# Patient Record
Sex: FEMALE | Race: WHITE | Employment: UNEMPLOYED | URBAN - METROPOLITAN AREA
[De-identification: names, ages, dates, MRNs, and addresses within clinical notes are randomized per-mention and may not be internally consistent; named-entity substitution may affect disease eponyms.]

---

## 2019-06-27 LAB
ANTIBODY SCREEN, EXTERNAL: NEGATIVE
CHLAMYDIA, EXTERNAL: NEGATIVE
HBSAG, EXTERNAL: NEGATIVE
HIV, EXTERNAL: NON REACTIVE
N. GONORRHEA, EXTERNAL: NEGATIVE
RUBELLA, EXTERNAL: NORMAL
T. PALLIDUM, EXTERNAL: NON REACTIVE
TYPE, ABO & RH, EXTERNAL: NORMAL

## 2020-01-10 LAB — GRBS, EXTERNAL: NEGATIVE

## 2020-02-06 ENCOUNTER — HOSPITAL ENCOUNTER (INPATIENT)
Age: 30
LOS: 2 days | Discharge: HOME | End: 2020-02-09
Attending: OBSTETRICS & GYNECOLOGY | Admitting: OBSTETRICS & GYNECOLOGY
Payer: COMMERCIAL

## 2020-02-06 DIAGNOSIS — R52 POSTPARTUM PAIN: Primary | ICD-10-CM

## 2020-02-06 PROCEDURE — 76060000078 HC EPIDURAL ANESTHESIA: Performed by: ANESTHESIOLOGY

## 2020-02-06 PROCEDURE — 75410000000 HC DELIVERY VAGINAL/SINGLE: Performed by: OBSTETRICS & GYNECOLOGY

## 2020-02-06 PROCEDURE — 75810000275 HC EMERGENCY DEPT VISIT NO LEVEL OF CARE

## 2020-02-06 PROCEDURE — 75410000003 HC RECOV DEL/VAG/CSECN EA 0.5 HR: Performed by: OBSTETRICS & GYNECOLOGY

## 2020-02-06 PROCEDURE — 76815 OB US LIMITED FETUS(S): CPT | Performed by: OBSTETRICS & GYNECOLOGY

## 2020-02-06 PROCEDURE — 75410000002 HC LABOR FEE PER 1 HR: Performed by: OBSTETRICS & GYNECOLOGY

## 2020-02-07 ENCOUNTER — ANESTHESIA EVENT (OUTPATIENT)
Dept: LABOR AND DELIVERY | Age: 30
End: 2020-02-07
Payer: COMMERCIAL

## 2020-02-07 ENCOUNTER — ANESTHESIA (OUTPATIENT)
Dept: LABOR AND DELIVERY | Age: 30
End: 2020-02-07
Payer: COMMERCIAL

## 2020-02-07 PROBLEM — O42.90 LEAKAGE OF AMNIOTIC FLUID: Status: ACTIVE | Noted: 2020-02-07

## 2020-02-07 PROBLEM — O47.9 UTERINE CONTRACTIONS DURING PREGNANCY: Status: ACTIVE | Noted: 2020-02-07

## 2020-02-07 LAB
A1 MICROGLOB PLACENTAL VAG QL: POSITIVE
BASOPHILS # BLD: 0.1 K/UL (ref 0–0.1)
BASOPHILS NFR BLD: 1 % (ref 0–1)
CONTROL LINE PRESENT?: NORMAL
DAILY QC (YES/NO)?: YES
DIFFERENTIAL METHOD BLD: ABNORMAL
EOSINOPHIL # BLD: 0.1 K/UL (ref 0–0.4)
EOSINOPHIL NFR BLD: 1 % (ref 0–7)
ERYTHROCYTE [DISTWIDTH] IN BLOOD BY AUTOMATED COUNT: 12.6 % (ref 11.5–14.5)
EXPIRATION DATE: NORMAL
HCT VFR BLD AUTO: 37 % (ref 35–47)
HGB BLD-MCNC: 12.6 G/DL (ref 11.5–16)
IMM GRANULOCYTES # BLD AUTO: 0.1 K/UL (ref 0–0.04)
IMM GRANULOCYTES NFR BLD AUTO: 1 % (ref 0–0.5)
INTERNAL NEGATIVE CONTROL: NORMAL
KIT LOT NO.: NORMAL
LYMPHOCYTES # BLD: 3.5 K/UL (ref 0.8–3.5)
LYMPHOCYTES NFR BLD: 25 % (ref 12–49)
MCH RBC QN AUTO: 29.6 PG (ref 26–34)
MCHC RBC AUTO-ENTMCNC: 34.1 G/DL (ref 30–36.5)
MCV RBC AUTO: 86.9 FL (ref 80–99)
MONOCYTES # BLD: 1 K/UL (ref 0–1)
MONOCYTES NFR BLD: 7 % (ref 5–13)
NEUTS SEG # BLD: 9.5 K/UL (ref 1.8–8)
NEUTS SEG NFR BLD: 65 % (ref 32–75)
NRBC # BLD: 0 K/UL (ref 0–0.01)
NRBC BLD-RTO: 0 PER 100 WBC
PH, VAGINAL FLUID: 6.5 (ref 5–6.1)
PLATELET # BLD AUTO: 279 K/UL (ref 150–400)
PMV BLD AUTO: 10.7 FL (ref 8.9–12.9)
RBC # BLD AUTO: 4.26 M/UL (ref 3.8–5.2)
WBC # BLD AUTO: 14.3 K/UL (ref 3.6–11)

## 2020-02-07 PROCEDURE — 77030019905 HC CATH URETH INTMIT MDII -A

## 2020-02-07 PROCEDURE — 74011250636 HC RX REV CODE- 250/636: Performed by: OBSTETRICS & GYNECOLOGY

## 2020-02-07 PROCEDURE — 59025 FETAL NON-STRESS TEST: CPT

## 2020-02-07 PROCEDURE — 36415 COLL VENOUS BLD VENIPUNCTURE: CPT

## 2020-02-07 PROCEDURE — 74011000250 HC RX REV CODE- 250: Performed by: NURSE ANESTHETIST, CERTIFIED REGISTERED

## 2020-02-07 PROCEDURE — 00HU33Z INSERTION OF INFUSION DEVICE INTO SPINAL CANAL, PERCUTANEOUS APPROACH: ICD-10-PCS | Performed by: ANESTHESIOLOGY

## 2020-02-07 PROCEDURE — 74011000250 HC RX REV CODE- 250: Performed by: ANESTHESIOLOGY

## 2020-02-07 PROCEDURE — 85025 COMPLETE CBC W/AUTO DIFF WBC: CPT

## 2020-02-07 PROCEDURE — 65270000029 HC RM PRIVATE

## 2020-02-07 PROCEDURE — 83986 ASSAY PH BODY FLUID NOS: CPT | Performed by: OBSTETRICS & GYNECOLOGY

## 2020-02-07 PROCEDURE — 84112 EVAL AMNIOTIC FLUID PROTEIN: CPT | Performed by: OBSTETRICS & GYNECOLOGY

## 2020-02-07 PROCEDURE — 99285 EMERGENCY DEPT VISIT HI MDM: CPT

## 2020-02-07 PROCEDURE — 0KQM0ZZ REPAIR PERINEUM MUSCLE, OPEN APPROACH: ICD-10-PCS | Performed by: OBSTETRICS & GYNECOLOGY

## 2020-02-07 PROCEDURE — 77030005513 HC CATH URETH FOL11 MDII -B

## 2020-02-07 PROCEDURE — 75410000002 HC LABOR FEE PER 1 HR: Performed by: OBSTETRICS & GYNECOLOGY

## 2020-02-07 PROCEDURE — 77030014125 HC TY EPDRL BBMI -B: Performed by: ANESTHESIOLOGY

## 2020-02-07 PROCEDURE — 74011000250 HC RX REV CODE- 250

## 2020-02-07 PROCEDURE — 74011250636 HC RX REV CODE- 250/636

## 2020-02-07 RX ORDER — BUPIVACAINE HYDROCHLORIDE 2.5 MG/ML
INJECTION, SOLUTION EPIDURAL; INFILTRATION; INTRACAUDAL AS NEEDED
Status: DISCONTINUED | OUTPATIENT
Start: 2020-02-07 | End: 2020-02-07 | Stop reason: HOSPADM

## 2020-02-07 RX ORDER — FENTANYL CITRATE 50 UG/ML
INJECTION, SOLUTION INTRAMUSCULAR; INTRAVENOUS
Status: COMPLETED
Start: 2020-02-07 | End: 2020-02-07

## 2020-02-07 RX ORDER — BUPIVACAINE HYDROCHLORIDE 5 MG/ML
INJECTION, SOLUTION EPIDURAL; INTRACAUDAL AS NEEDED
Status: DISCONTINUED | OUTPATIENT
Start: 2020-02-07 | End: 2020-02-07 | Stop reason: HOSPADM

## 2020-02-07 RX ORDER — FENTANYL/BUPIVACAINE/NS/PF 2-1250MCG
1-16 PREFILLED PUMP RESERVOIR EPIDURAL CONTINUOUS
Status: DISCONTINUED | OUTPATIENT
Start: 2020-02-07 | End: 2020-02-08 | Stop reason: HOSPADM

## 2020-02-07 RX ORDER — NALOXONE HYDROCHLORIDE 0.4 MG/ML
0.4 INJECTION, SOLUTION INTRAMUSCULAR; INTRAVENOUS; SUBCUTANEOUS AS NEEDED
Status: DISCONTINUED | OUTPATIENT
Start: 2020-02-07 | End: 2020-02-08

## 2020-02-07 RX ORDER — NALBUPHINE HYDROCHLORIDE 10 MG/ML
2.5 INJECTION, SOLUTION INTRAMUSCULAR; INTRAVENOUS; SUBCUTANEOUS
Status: ACTIVE | OUTPATIENT
Start: 2020-02-07 | End: 2020-02-07

## 2020-02-07 RX ORDER — LIDOCAINE HYDROCHLORIDE AND EPINEPHRINE 20; 5 MG/ML; UG/ML
INJECTION, SOLUTION EPIDURAL; INFILTRATION; INTRACAUDAL; PERINEURAL AS NEEDED
Status: DISCONTINUED | OUTPATIENT
Start: 2020-02-07 | End: 2020-02-07 | Stop reason: HOSPADM

## 2020-02-07 RX ORDER — OXYTOCIN/0.9 % SODIUM CHLORIDE 30/500 ML
0-25 PLASTIC BAG, INJECTION (ML) INTRAVENOUS
Status: DISCONTINUED | OUTPATIENT
Start: 2020-02-07 | End: 2020-02-09

## 2020-02-07 RX ORDER — LIDOCAINE HYDROCHLORIDE 10 MG/ML
INJECTION INFILTRATION; PERINEURAL
Status: COMPLETED
Start: 2020-02-07 | End: 2020-02-07

## 2020-02-07 RX ORDER — BUTORPHANOL TARTRATE 2 MG/ML
2 INJECTION INTRAMUSCULAR; INTRAVENOUS
Status: DISCONTINUED | OUTPATIENT
Start: 2020-02-07 | End: 2020-02-09 | Stop reason: HOSPADM

## 2020-02-07 RX ORDER — EPHEDRINE SULFATE/0.9% NACL/PF 50 MG/5 ML
12.5 SYRINGE (ML) INTRAVENOUS
Status: ACTIVE | OUTPATIENT
Start: 2020-02-07 | End: 2020-02-08

## 2020-02-07 RX ORDER — FENTANYL CITRATE 50 UG/ML
100 INJECTION, SOLUTION INTRAMUSCULAR; INTRAVENOUS ONCE
Status: COMPLETED | OUTPATIENT
Start: 2020-02-07 | End: 2020-02-07

## 2020-02-07 RX ORDER — SODIUM CHLORIDE, SODIUM LACTATE, POTASSIUM CHLORIDE, CALCIUM CHLORIDE 600; 310; 30; 20 MG/100ML; MG/100ML; MG/100ML; MG/100ML
125 INJECTION, SOLUTION INTRAVENOUS CONTINUOUS
Status: DISCONTINUED | OUTPATIENT
Start: 2020-02-07 | End: 2020-02-09

## 2020-02-07 RX ORDER — TERBUTALINE SULFATE 1 MG/ML
INJECTION SUBCUTANEOUS
Status: DISPENSED
Start: 2020-02-07 | End: 2020-02-07

## 2020-02-07 RX ORDER — SODIUM CHLORIDE 0.9 % (FLUSH) 0.9 %
5-40 SYRINGE (ML) INJECTION EVERY 8 HOURS
Status: DISCONTINUED | OUTPATIENT
Start: 2020-02-07 | End: 2020-02-09

## 2020-02-07 RX ORDER — EPHEDRINE SULFATE/0.9% NACL/PF 50 MG/5 ML
SYRINGE (ML) INTRAVENOUS
Status: DISPENSED
Start: 2020-02-07 | End: 2020-02-07

## 2020-02-07 RX ORDER — ONDANSETRON 2 MG/ML
4 INJECTION INTRAMUSCULAR; INTRAVENOUS
Status: DISCONTINUED | OUTPATIENT
Start: 2020-02-07 | End: 2020-02-08 | Stop reason: HOSPADM

## 2020-02-07 RX ORDER — SODIUM CHLORIDE 0.9 % (FLUSH) 0.9 %
5-40 SYRINGE (ML) INJECTION AS NEEDED
Status: DISCONTINUED | OUTPATIENT
Start: 2020-02-07 | End: 2020-02-09 | Stop reason: HOSPADM

## 2020-02-07 RX ORDER — LIDOCAINE HYDROCHLORIDE 20 MG/ML
INJECTION, SOLUTION EPIDURAL; INFILTRATION; INTRACAUDAL; PERINEURAL AS NEEDED
Status: DISCONTINUED | OUTPATIENT
Start: 2020-02-07 | End: 2020-02-07 | Stop reason: HOSPADM

## 2020-02-07 RX ADMIN — SODIUM CHLORIDE, SODIUM LACTATE, POTASSIUM CHLORIDE, AND CALCIUM CHLORIDE 125 ML/HR: 600; 310; 30; 20 INJECTION, SOLUTION INTRAVENOUS at 12:35

## 2020-02-07 RX ADMIN — BUPIVACAINE HYDROCHLORIDE 2.5 ML: 5 INJECTION, SOLUTION EPIDURAL; INTRACAUDAL; PERINEURAL at 14:34

## 2020-02-07 RX ADMIN — LIDOCAINE HYDROCHLORIDE: 10 INJECTION, SOLUTION INFILTRATION; PERINEURAL at 23:29

## 2020-02-07 RX ADMIN — Medication 12 ML/HR: at 19:49

## 2020-02-07 RX ADMIN — Medication 10 ML/HR: at 13:12

## 2020-02-07 RX ADMIN — SODIUM CHLORIDE, SODIUM LACTATE, POTASSIUM CHLORIDE, AND CALCIUM CHLORIDE 125 ML/HR: 600; 310; 30; 20 INJECTION, SOLUTION INTRAVENOUS at 05:32

## 2020-02-07 RX ADMIN — ONDANSETRON 4 MG: 2 INJECTION INTRAMUSCULAR; INTRAVENOUS at 01:57

## 2020-02-07 RX ADMIN — BUPIVACAINE HYDROCHLORIDE 4 ML: 2.5 INJECTION, SOLUTION EPIDURAL; INFILTRATION; INTRACAUDAL; PERINEURAL at 04:43

## 2020-02-07 RX ADMIN — OXYTOCIN-SODIUM CHLORIDE 0.9% IV SOLN 30 UNIT/500ML 2 MILLI-UNITS/MIN: 30-0.9/5 SOLUTION at 06:49

## 2020-02-07 RX ADMIN — LIDOCAINE HYDROCHLORIDE,EPINEPHRINE BITARTRATE 3 ML: 20; .005 INJECTION, SOLUTION EPIDURAL; INFILTRATION; INTRACAUDAL; PERINEURAL at 04:43

## 2020-02-07 RX ADMIN — FENTANYL CITRATE 100 MCG: 50 INJECTION, SOLUTION INTRAMUSCULAR; INTRAVENOUS at 23:48

## 2020-02-07 RX ADMIN — SODIUM CHLORIDE, SODIUM LACTATE, POTASSIUM CHLORIDE, AND CALCIUM CHLORIDE 1000 ML: 600; 310; 30; 20 INJECTION, SOLUTION INTRAVENOUS at 03:26

## 2020-02-07 RX ADMIN — Medication 10 ML/HR: at 05:25

## 2020-02-07 RX ADMIN — SODIUM CHLORIDE, SODIUM LACTATE, POTASSIUM CHLORIDE, AND CALCIUM CHLORIDE 999 ML/HR: 600; 310; 30; 20 INJECTION, SOLUTION INTRAVENOUS at 04:23

## 2020-02-07 RX ADMIN — LIDOCAINE HYDROCHLORIDE 2.5 ML: 20 INJECTION, SOLUTION EPIDURAL; INFILTRATION; INTRACAUDAL; PERINEURAL at 14:34

## 2020-02-07 NOTE — PROGRESS NOTES
0700: Bedside and Verbal shift change report given to GINA Bates (oncoming nurse) by ARSLAN Jean,OREN (offgoing nurse). Report included the following information SBAR, Kardex, Procedure Summary, Intake/Output, MAR, Accordion, Recent Results and Med Rec Status. 7621: Dr. Jazmine Gonzalez at bedside to assess pt and discuss POC, SVE per MD, 5/80/-1. Pt repositioned on left side with peanut ball. 3037: This RN at bedside for FHR decel, pt repositioned on right side, pitocin stopped. 7803: Additional RNs to bedside for prolonged decel, IVFB started, O2 placed per non-rebreather face mask, pt turned on left side, bed in trendelenburg. Dr. Jazmine Gonzalez arrived at bedside, FHR returned to baseline. Will leave pitocin off and restart when able. 1305: Dr. Jazmine Gonzalez at bedside to assess pt and discuss POC, SVE per MD, 6/90/-1 to 0. Per MD, thicker cervix on right sidem pt repositioned on right side with peanut ball in flying cowgirl position. 1430: GINA Al at bedside to assess epidural following pt complaints of contraction pain on left side despite bolus via epidural pump. Bolus administered per CRNA. 1445: Pt reports no more pain with contractions. 1730: Dr. Jazmine Gonzalez at bedside to assess pt and discuss POC, SVE per MD, 8/90/0 to +1. US done per MD, baby OP. Will attempt position changes as tolerated. Dr. Jazmine Gonzalez notified of pink UOP, decreased amount in last ~1.5 hrs. No new orders. 1738: This RN and Jovany, OREN at bedside to assist pt in position changes. Side lying hip release on left for 4 contractions, side lying hip release on right for 3 contractions. Pt then assisted into exaggerated right side lying with peanut ball. 1808: Pt positioned in exaggerated left side lying with peanut ball. 1910: Bedside and Verbal shift change report given to NANDO Maldonado RN (oncoming nurse) by GINA Bates (offgoing nurse).  Report included the following information SBAR, Kardex, Procedure Summary, Intake/Output, MAR, Accordion, Recent Results and Med Rec Status.

## 2020-02-07 NOTE — PROGRESS NOTES
Late entry from 1300    Patient comfortable with epidural.    FHTs baseline 130, mod martita, accels present, decels--occ variables/earlies  TOCO q 4-5 minutes with periods of coupling  Pit @ 8  Cx 6/90/-1, thicker on pt's right (?related to griffin catheter)    Cont to titrate pitocin & cont position changes.

## 2020-02-07 NOTE — ANESTHESIA PREPROCEDURE EVALUATION
Relevant Problems   No relevant active problems       Anesthetic History   No history of anesthetic complications            Review of Systems / Medical History  Patient summary reviewed, nursing notes reviewed and pertinent labs reviewed    Pulmonary  Within defined limits                 Neuro/Psych   Within defined limits           Cardiovascular  Within defined limits                     GI/Hepatic/Renal  Within defined limits              Endo/Other  Within defined limits           Other Findings   Comments: scoliosis                Anesthetic Plan    ASA: 2  Anesthesia type: epidural            Anesthetic plan and risks discussed with: Patient

## 2020-02-07 NOTE — H&P
History & Physical    Name: Ken Ball MRN: [id number]  SSN: [id number]    YOB: 1990  Age: 30 y.o. Sex: female        Subjective:     Estimated Date of Delivery: 20  OB History    Para Term  AB Living   1             SAB TAB Ectopic Molar Multiple Live Births                    # Outcome Date GA Lbr Joey/2nd Weight Sex Delivery Anes PTL Lv   1 Current                Ms. Vilma Wooten is a  admitted with pregnancy at 38w9d with dating based upon 8 wk ultrasound for SROM and early labor. Complains of leakage of clear amniotic fluid that started at 2230 followed by the onset of regular painful uterine contractions every 3-6 minutes. Denies vaginal bleeding and has just experienced episode of nausea and vomiting. Prenatal care is complicated by scoliosis, chronic anxiety, and fetal heart echogenic foci. . . Please see prenatal records for details. Past Medical History:   Diagnosis Date    Essential hypertension 2217-9640    Htn, no medications   Elevated blood pressures prior to pregnancy thought to be secondary to stress  Chronic anxiety  Past Surgical History:   Procedure Laterality Date    HX OTHER SURGICAL  2012   tonosillectomy    GynHx: denies STIs  SH: social ETOH prior to pregnancy  Denies tobacco and illicit drug use  Social History     Occupational History    Not on file   Tobacco Use    Smoking status: Not on file   Substance and Sexual Activity    Alcohol use: Not on file    Drug use: Not on file    Sexual activity: Not on file     No family history on file. Allergies   Allergen Reactions    Azithromycin Swelling    Cefprozil Hives     Prior to Admission medications    Medication Sig Start Date End Date Taking? Authorizing Provider   PNV No.40-Iron Fum-FA Cmb No.1 27-1 mg tab Take  by mouth. Yes Provider, Historical        Review of Systems: Pertinent items are noted in HPI.     Objective:     Vitals:  Vitals:    20 2356 20 0006   BP: 125/72 Pulse: 72    Resp: 16    Temp: 98.2 °F (36.8 °C)    Weight:  79.4 kg (175 lb)   Height:  5' 5\" (1.651 m)        Physical Exam:  Patient without distress. Heart: Regular rate and rhythm or S1S2 present  Lung: clear to auscultation throughout lung fields, no wheezes, no rales, no rhonchi and normal respiratory effort  Abdomen: soft, nontender, gravid, EFW 7 pounds  Fundus: soft and non tender  Perineum: blood absent, amniotic fluid present  Cervical Exam: 1-/-3 vtx (exam by nurse)  Lower Extremities:  - Edema No  Membranes:  Spontaneous Rupture of Membranes;  Amniotic Fluid: clear fluid  Fetal Heart Rate: Baseline: 140 per minute  Variability: moderate  Accelerations: yes  Decelerations: none  Uterine contractions: regular, every 1-4 minutes    Prenatal Labs:   Lab Results   Component Value Date/Time    Rubella, External Immune 2019    GrBStrep, External Negative 01/10/2020    HBsAg, External Negative 2019    HIV, External Non reactive 2019    Gonorrhea, External Negative 2019    Chlamydia, External negative 2019    ABO,Rh A Positive 2019   1 hr gtt 77        Assessment/Plan:   Ass:  at 40 6/7 wks with SROM in early labor, cat 1 fetal tracing    Plan: Admit for labor management  Patient informed that she may ambulate since she declines anesthesia at this time  Epidural prn  Iv pain meds prn pending reactive fetal tracing and sve < 7 cm

## 2020-02-07 NOTE — PROGRESS NOTES
Patient reports feeling some pressure with contractions, otherwise she is comfortable.     FHTs baseline 145, mod variability, accels present, decels absent  TOCO q 2 minutes but still with periods of coupling/tripling and spacing  Pit @ 14  Cx 8/90/0/LOP    RN will try spinning babies

## 2020-02-07 NOTE — ANESTHESIA PROCEDURE NOTES
Epidural Block    Start time: 2/7/2020 4:35 AM  End time: 2/7/2020 4:48 AM  Performed by: Lisa Santana MD  Authorized by:  Lisa Santana MD     Pre-Procedure  Indication: labor epidural    Preanesthetic Checklist: patient identified, risks and benefits discussed, anesthesia consent, timeout performed and anesthesia consent      Epidural:   Patient position:  Seated  Prep region:  Lumbar  Prep: Betadine    Location:  L3-4    Needle and Epidural Catheter:   Needle Type:  Tuohy  Needle Gauge:  17 G  Injection Technique:  Loss of resistance using saline  Attempts:  1  Catheter Size:  18 G  Events: no paresthesia and negative aspiration test    Test Dose:  Lidocaine 1.5% w/ epi and negative    Assessment:   Catheter Secured:  Tegaderm and tape  Insertion:  Uncomplicated  Patient tolerance:  Patient tolerated the procedure well with no immediate complications

## 2020-02-07 NOTE — PROGRESS NOTES
Received sign out from Dr. Nai Moraes just after 0700. In to meet patient and . She is a 31 yo G1 @ 40 6/7 wks who presented last evening with SROM and early labor. She is now s/p epidural and is comfortable. Pitocin is infusing.     FHTs baseline 145, moderate variability, accels present, decels absent  TOCO some coupling noted  Cx 5/80/-1  Pit @ 2    Continue to titrate pitocin  Encourage position changes & peanut ball  CEFM/TOCO

## 2020-02-07 NOTE — PROGRESS NOTES
2325  Patient arrives from ED reporting a rupture of membranes at 2230 at home. Patient reports a moderate amount of fluid that looked clear to yellow with small chunks of brown mucusy blood in it. Patient reports to have had her membranes swept 2/6, and her cervical dialation has been 1 cm in the office the same time. Patient reports contractions every 5 minutes apart. 2335  Nitrazine test Positive  2345  Amnisure Positive  2350  SVE 1.5/30/+3  EFM and TOCO placed. Dr. Kade Modi notified of patient arrival.  Dr Kade Modi will confirm fetal position with an ultrasound. 0030  Reactive NST recorded. 2886  Patient is vomiting 400 ml liquid. 0864  Dr Kade Modi is at bedside assessing the patient , and is talking about the plan of care. Patient verbalized understanding. 1  Dr Kade Modi is performing an ultrasound to confirm the head position; vertex. [place] 60 signed. 0150  Peripheral IV started, and labs drawn. Per Dr. Kade Modi, patient may walk around at this time. 0159  Patient is ambulating in hallway. 0230  Patient is sitting on birthing ball.    0325  Patient call, patient is ready to start the fluid bolus for an epidural.    5483  Patient is vomiting in room. 0430  Dr. Daysi Perales notified patient's readyness for epidural placement. 3124  Dr Daysi Perales at bedside assessing and education the patient about the epidural.    0434  Time Out performed. 5552  Test Dose. Patient's BP is soft, Charge RN T Blanca notified and asked to bring ephedrine. 6737  Mcrae catheter placed. 0681  Patient repositioned onto lateral left side with peanut ball. 8332  Dr. Daysi Perales updated on patient progress. 0505  SVE 4/80/-2    0600  Patient is fast asleep. Did not reposition at this time. 2798  Dr Kade Modi at bedside assessing patient progress. Per Dr. Kade Modi, pitocin will be started to augment labor at this time.     2210  Pitocin started    0700  Bedside and Verbal shift change report given to Prabha Castillo RN (oncoming nurse) by Amber Sheppard RN (offgoing nurse). Report included the following information SBAR, Kardex, Procedure Summary, Intake/Output, MAR, Accordion, Recent Results and Med Rec Status.

## 2020-02-08 LAB
BASOPHILS # BLD: 0 K/UL (ref 0–0.1)
BASOPHILS NFR BLD: 0 % (ref 0–1)
DIFFERENTIAL METHOD BLD: ABNORMAL
EOSINOPHIL # BLD: 0 K/UL (ref 0–0.4)
EOSINOPHIL NFR BLD: 0 % (ref 0–7)
ERYTHROCYTE [DISTWIDTH] IN BLOOD BY AUTOMATED COUNT: 12.8 % (ref 11.5–14.5)
HCT VFR BLD AUTO: 32.9 % (ref 35–47)
HGB BLD-MCNC: 11.3 G/DL (ref 11.5–16)
IMM GRANULOCYTES # BLD AUTO: 0 K/UL (ref 0–0.04)
IMM GRANULOCYTES NFR BLD AUTO: 0 % (ref 0–0.5)
LYMPHOCYTES # BLD: 1.9 K/UL (ref 0.8–3.5)
LYMPHOCYTES NFR BLD: 7 % (ref 12–49)
MCH RBC QN AUTO: 30.1 PG (ref 26–34)
MCHC RBC AUTO-ENTMCNC: 34.3 G/DL (ref 30–36.5)
MCV RBC AUTO: 87.5 FL (ref 80–99)
MONOCYTES # BLD: 0.3 K/UL (ref 0–1)
MONOCYTES NFR BLD: 1 % (ref 5–13)
NEUTS BAND NFR BLD MANUAL: 9 %
NEUTS SEG # BLD: 25.1 K/UL (ref 1.8–8)
NEUTS SEG NFR BLD: 83 % (ref 32–75)
NRBC # BLD: 0 K/UL (ref 0–0.01)
NRBC BLD-RTO: 0 PER 100 WBC
PLATELET # BLD AUTO: 223 K/UL (ref 150–400)
PMV BLD AUTO: 10.2 FL (ref 8.9–12.9)
RBC # BLD AUTO: 3.76 M/UL (ref 3.8–5.2)
RBC MORPH BLD: ABNORMAL
WBC # BLD AUTO: 27.3 K/UL (ref 3.6–11)

## 2020-02-08 PROCEDURE — 77030021125

## 2020-02-08 PROCEDURE — 74011250636 HC RX REV CODE- 250/636: Performed by: OBSTETRICS & GYNECOLOGY

## 2020-02-08 PROCEDURE — 77030019905 HC CATH URETH INTMIT MDII -A

## 2020-02-08 PROCEDURE — 65270000029 HC RM PRIVATE

## 2020-02-08 PROCEDURE — 85025 COMPLETE CBC W/AUTO DIFF WBC: CPT

## 2020-02-08 PROCEDURE — 36415 COLL VENOUS BLD VENIPUNCTURE: CPT

## 2020-02-08 PROCEDURE — 74011250637 HC RX REV CODE- 250/637: Performed by: OBSTETRICS & GYNECOLOGY

## 2020-02-08 RX ORDER — ONDANSETRON 4 MG/1
4 TABLET, ORALLY DISINTEGRATING ORAL
Status: ACTIVE | OUTPATIENT
Start: 2020-02-08 | End: 2020-02-09

## 2020-02-08 RX ORDER — OXYTOCIN/0.9 % SODIUM CHLORIDE 20/1000 ML
125-500 PLASTIC BAG, INJECTION (ML) INTRAVENOUS ONCE
Status: COMPLETED | OUTPATIENT
Start: 2020-02-08 | End: 2020-02-08

## 2020-02-08 RX ORDER — HYDROCODONE BITARTRATE AND ACETAMINOPHEN 5; 325 MG/1; MG/1
1 TABLET ORAL
Status: DISCONTINUED | OUTPATIENT
Start: 2020-02-08 | End: 2020-02-09 | Stop reason: HOSPADM

## 2020-02-08 RX ORDER — HYDROCORTISONE ACETATE PRAMOXINE HCL 2.5; 1 G/100G; G/100G
CREAM TOPICAL AS NEEDED
Status: DISCONTINUED | OUTPATIENT
Start: 2020-02-08 | End: 2020-02-09 | Stop reason: HOSPADM

## 2020-02-08 RX ORDER — CLINDAMYCIN PHOSPHATE 600 MG/50ML
600 INJECTION, SOLUTION INTRAVENOUS ONCE
Status: COMPLETED | OUTPATIENT
Start: 2020-02-08 | End: 2020-02-08

## 2020-02-08 RX ORDER — ACETAMINOPHEN 325 MG/1
650 TABLET ORAL
Status: DISCONTINUED | OUTPATIENT
Start: 2020-02-08 | End: 2020-02-09 | Stop reason: HOSPADM

## 2020-02-08 RX ORDER — IBUPROFEN 800 MG/1
800 TABLET ORAL EVERY 8 HOURS
Status: DISCONTINUED | OUTPATIENT
Start: 2020-02-08 | End: 2020-02-09 | Stop reason: HOSPADM

## 2020-02-08 RX ORDER — ZOLPIDEM TARTRATE 5 MG/1
5 TABLET ORAL
Status: DISCONTINUED | OUTPATIENT
Start: 2020-02-08 | End: 2020-02-09 | Stop reason: HOSPADM

## 2020-02-08 RX ORDER — DOCUSATE SODIUM 100 MG/1
100 CAPSULE, LIQUID FILLED ORAL
Status: DISCONTINUED | OUTPATIENT
Start: 2020-02-08 | End: 2020-02-09 | Stop reason: HOSPADM

## 2020-02-08 RX ORDER — LANOLIN ALCOHOL/MO/W.PET/CERES
1 CREAM (GRAM) TOPICAL
Status: DISCONTINUED | OUTPATIENT
Start: 2020-02-08 | End: 2020-02-09 | Stop reason: HOSPADM

## 2020-02-08 RX ORDER — OXYCODONE AND ACETAMINOPHEN 5; 325 MG/1; MG/1
1 TABLET ORAL
Status: DISCONTINUED | OUTPATIENT
Start: 2020-02-08 | End: 2020-02-08

## 2020-02-08 RX ORDER — HYDROCODONE BITARTRATE AND ACETAMINOPHEN 5; 325 MG/1; MG/1
2 TABLET ORAL
Status: DISCONTINUED | OUTPATIENT
Start: 2020-02-08 | End: 2020-02-09 | Stop reason: HOSPADM

## 2020-02-08 RX ORDER — HYDROCODONE BITARTRATE AND ACETAMINOPHEN 5; 325 MG/1; MG/1
2 TABLET ORAL
Status: DISCONTINUED | OUTPATIENT
Start: 2020-02-08 | End: 2020-02-08

## 2020-02-08 RX ORDER — NALOXONE HYDROCHLORIDE 0.4 MG/ML
0.4 INJECTION, SOLUTION INTRAMUSCULAR; INTRAVENOUS; SUBCUTANEOUS AS NEEDED
Status: DISCONTINUED | OUTPATIENT
Start: 2020-02-08 | End: 2020-02-09 | Stop reason: HOSPADM

## 2020-02-08 RX ORDER — SIMETHICONE 80 MG
80 TABLET,CHEWABLE ORAL
Status: DISCONTINUED | OUTPATIENT
Start: 2020-02-08 | End: 2020-02-09 | Stop reason: HOSPADM

## 2020-02-08 RX ORDER — DIPHENHYDRAMINE HCL 25 MG
25 CAPSULE ORAL
Status: DISCONTINUED | OUTPATIENT
Start: 2020-02-08 | End: 2020-02-09 | Stop reason: HOSPADM

## 2020-02-08 RX ADMIN — HYDROCODONE BITARTRATE AND ACETAMINOPHEN 1 TABLET: 5; 325 TABLET ORAL at 05:39

## 2020-02-08 RX ADMIN — FERROUS SULFATE TAB 325 MG (65 MG ELEMENTAL FE) 325 MG: 325 (65 FE) TAB at 09:42

## 2020-02-08 RX ADMIN — DOCUSATE SODIUM 100 MG: 100 CAPSULE, LIQUID FILLED ORAL at 05:39

## 2020-02-08 RX ADMIN — IBUPROFEN 800 MG: 800 TABLET ORAL at 18:02

## 2020-02-08 RX ADMIN — CLINDAMYCIN PHOSPHATE 600 MG: 600 INJECTION, SOLUTION INTRAVENOUS at 00:47

## 2020-02-08 RX ADMIN — HYDROCODONE BITARTRATE AND ACETAMINOPHEN 1 TABLET: 5; 325 TABLET ORAL at 23:05

## 2020-02-08 RX ADMIN — IBUPROFEN 800 MG: 800 TABLET ORAL at 09:42

## 2020-02-08 RX ADMIN — IBUPROFEN 800 MG: 800 TABLET ORAL at 00:50

## 2020-02-08 RX ADMIN — Medication 2500 MILLI-UNITS/HR: at 00:47

## 2020-02-08 NOTE — PROGRESS NOTES
1950: RN at bedside for epidural sign off, patient rate about written order of fentanyl/bupivacaine of 10ml/hr running at 12ml/hr. Patient significant other stated that rate was increased per anesthesia due to breakthrough pain. Dariana Santana MD called for order update, per MD ok to continue to run at 12 ml/hr if patient is comfortable - MD to provide documentation/order update to reflect verbal orders.

## 2020-02-08 NOTE — PROGRESS NOTES
Post-Partum Day Number 1 Progress Note    Ricci Jeter     Assessment: Doing well, post partum day 1 s/p VAVD with OP presentation complicated by postpartum hemorrhage. [address] with EBL 1100 resolved with cytotec, methergine, and uterine sweep. Pre delivery hemoglobin 12.6, cbc from this am pending. Urinary Retention- patient with straight cath at 6, if no urination in 4 hours will repeat. Consider griffin placement. Plan:  1. Continue routine postpartum and perineal care as well as maternal education. Information for the patient's :  Skye Porter [[id number]]   Vaginal, Spontaneous   Patient doing well without significant complaint. Patient hasn't urinated since delivery. She was straight cathed at [place]. She denies feeling lightheaded, dizzy, or short of breath. Vitals:  Visit Vitals  /88   Pulse 86   Temp 98.4 °F (36.9 °C)   Resp 14   Ht 5' 5\" (1.651 m)   Wt 79.4 kg (175 lb)   SpO2 92%   Breastfeeding Unknown   BMI 29.12 kg/m²     Temp (24hrs), Av.7 °F (37.1 °C), Min:98.3 °F (36.8 °C), Max:99.9 °F (37.7 °C)        Exam:   Patient without distress. Abdomen soft, fundus firm, nontender                Perineum with normal lochia noted. Lower extremities are negative for swelling, cords or tenderness. Labs:     Lab Results   Component Value Date/Time    WBC 14.3 (H) 2020 02:02 AM    HGB 12.6 2020 02:02 AM    HCT 37.0 2020 02:02 AM    PLATELET 481  02:02 AM       No results found for this or any previous visit (from the past 24 hour(s)).

## 2020-02-08 NOTE — PROGRESS NOTES
Pt seen and examined. Fetal tachycardia with moderate variability, Cat II strip.      98.6, VSS  Sve: C/+2 by my exam, movement noted with pushing  FHT: Cat   [place]: Q2      A/ G1 with epidural, pushing 1 hour 50 minutes. Fetal tachycardia with moderate variability.   NO fever    P/ bolus 500 cc, watch for fever, continue to push

## 2020-02-08 NOTE — PROGRESS NOTES
Pt seen and examined, report received from outgoing physician. Pt feeling constant pressure.     AVSS  SVE: C/+2 per RN  [place]: Q1-2  FHT: Cat I      A/ G1 now complete    P/ begin pushing

## 2020-02-08 NOTE — ROUTINE PROCESS
Bedside and Verbal shift change report given to Cristo Coyle RN  (oncoming nurse) by Param Odonnell RN  (offgoing nurse). Report included the following information SBAR, Kardex, Intake/Output, MAR and Recent Results.

## 2020-02-08 NOTE — L&D DELIVERY NOTE
This patient was 30 or more weeks gestation at the time of [facility] go-live. For complete information pertaining to this patient's pregnancy, please refer to the paper chart and ACOG form. Delivery Note    Obstetrician:  Chata Smith MD    Assistant: none    Pre-Delivery Diagnosis: Term pregnancy and PROM    Post-Delivery Diagnosis: Living  infant(s), Stillborn infant(s), Female and persistent OP    Intrapartum Event: None    Procedure: Vacuum assisted delivery, placed Vacuum with pt at +2 station and OP, pulled with 2 contractions until fetal head +4, pop off X2, pt continued to push without vacuum for vaginal delivery. Epidural: YES    Monitor:  Fetal Heart Tones - External    Indications for instrumental delivery: none or maternal fatigue    Estimated Blood Loss: 1100, PPH, pitocin, cytotec, and methergine given. Uterine sweep for moderate amount of clot X2, Uterine massage performed     Episiotomy: n/a    Laceration(s):  2nd degree    Laceration(s) repair: YES    Presentation: Cephalic    Fetal Description: dai    Fetal Position: Occiput Posterior    Birth Weight: pending    Birth Length: pending    Apgar - One Minute: 8    Apgar - Five Minutes: 9    Umbilical Cord: 3 vessels present    Specimens: placenta meconium stained not sent           Complications:  PP hemorrhage           Cord Blood Results:   Information for the patient's :  Danyell Jetti [[id number]]   No results found for: ANKIT Tobin    Prenatal Labs:     Lab Results   Component Value Date/Time    ABO,Rh A Positive 2019    HBsAg, External Negative 2019    HIV, External Non reactive 2019    Rubella, External Immune 2019    Gonorrhea, External Negative 2019    Chlamydia, External negative 2019    GrBStrep, External Negative 01/10/2020        Attending Attestation: I performed the procedure, pt stable post resuscitative efforts for hemorrhage.   CBC in AM    Signed By: Efrem Murdock MD     February 8, 2020

## 2020-02-08 NOTE — PROGRESS NOTES
2000 Dr Jeremi Polanco at the bedside to introduce herself to the patient.  Pt begin pushing.  Pt continues to push with nurse at the bedside. 2100 Pt continues to push with nurse at the bedside. 2130 Pt continues to push with nurse at the bedside. 2200 Pt continues to push with nurse at the bedside. 65 Pt continues to push with nurse at the bedside. [address] Dr Jeremi Polanco at the bedside.     [address] assisted  of live female

## 2020-02-08 NOTE — PROGRESS NOTES
0563 pt up to BR, self brielle care w/set up. Unable to void. See doc flow sheet- straight cath for 300cc out. Then transfer to MIU via w/c, accompanied by tech, family, & baby.

## 2020-02-08 NOTE — PROGRESS NOTES
Bedside shift change report given to Elizabeth Ramirez RN (oncoming nurse) by Rufus Haas RN & Nikkie MARTE (offgoing nurse). Report included the following information SBAR, Kardex and MAR.

## 2020-02-08 NOTE — LACTATION NOTE
This note was copied from a baby's chart. 1100 - Note on patient door that family is resting; [facility] consult deferred at this time. 1400 - [facility] rounds. Family at bedside and mother asked to defer consult. Mother aware she may call for assistance with breastfeeding at any time. [phone] - Checked in with mom's primary RN. Patient hasn't slept and wishes to sleep. RN reports that mom is doing well breastfeeding. Output is WNL and sufficent with numerous breastfeeds noted in chart.

## 2020-02-09 VITALS
HEART RATE: 77 BPM | HEIGHT: 65 IN | OXYGEN SATURATION: 92 % | BODY MASS INDEX: 29.16 KG/M2 | SYSTOLIC BLOOD PRESSURE: 114 MMHG | DIASTOLIC BLOOD PRESSURE: 73 MMHG | TEMPERATURE: 97.6 F | WEIGHT: 175 LBS | RESPIRATION RATE: 16 BRPM

## 2020-02-09 PROCEDURE — 74011250637 HC RX REV CODE- 250/637: Performed by: OBSTETRICS & GYNECOLOGY

## 2020-02-09 RX ORDER — IBUPROFEN 800 MG/1
800 TABLET ORAL
Qty: 60 TAB | Refills: 1 | Status: SHIPPED | OUTPATIENT
Start: 2020-02-09

## 2020-02-09 RX ORDER — HYDROCODONE BITARTRATE AND ACETAMINOPHEN 5; 325 MG/1; MG/1
1 TABLET ORAL
Qty: 16 TAB | Refills: 0 | Status: SHIPPED | OUTPATIENT
Start: 2020-02-09 | End: 2020-02-16

## 2020-02-09 RX ADMIN — IBUPROFEN 800 MG: 800 TABLET ORAL at 02:04

## 2020-02-09 RX ADMIN — DOCUSATE SODIUM 100 MG: 100 CAPSULE, LIQUID FILLED ORAL at 10:18

## 2020-02-09 RX ADMIN — FERROUS SULFATE TAB 325 MG (65 MG ELEMENTAL FE) 325 MG: 325 (65 FE) TAB at 10:18

## 2020-02-09 RX ADMIN — IBUPROFEN 800 MG: 800 TABLET ORAL at 10:18

## 2020-02-09 NOTE — PROGRESS NOTES
Post-Partum Day Number 2 Progress Note    Jimmy Medrano     Assessment: Doing well, post partum day 2 s/p VAVD with OP presentation complicated by postpartum hemorrhage.     PPH with EBL 1100 resolved with cytotec, methergine, and uterine sweep. Pre delivery hemoglobin 12.6, PPD 1 CBC 11. 3.      Urinary Retention-resolved   Plan:   1. Discharge home today  2. Follow up in office in 6 weeks with Suha Swartz MD  3. Post partum activity advised, diet as tolerated  4. Discharge Medications: ibuprofen, percocet and medications prior to admission    Information for the patient's :  Tony Cook [[id number]]   Vaginal, Spontaneous   Patient doing well without significant complaint. Voiding without difficulty, normal lochia. Vitals:  Visit Vitals  /67   Pulse 68   Temp 97.6 °F (36.4 °C)   Resp 16   Ht 5' 5\" (1.651 m)   Wt 79.4 kg (175 lb)   SpO2 92%   Breastfeeding Unknown   BMI 29.12 kg/m²     Temp (24hrs), Av.8 °F (36.6 °C), Min:97.6 °F (36.4 °C), Max:98.3 °F (36.8 °C)      Exam:         Patient without distress. Abdomen soft, fundus firm, nontender                 Lower extremities are negative for swelling, cords or tenderness.     Labs:     Lab Results   Component Value Date/Time    WBC 27.3 (H) 2020 09:26 AM    WBC 14.3 (H) 2020 02:02 AM    HGB 11.3 (L) 2020 09:26 AM    HGB 12.6 2020 02:02 AM    HCT 32.9 (L) 2020 09:26 AM    HCT 37.0 2020 02:02 AM    PLATELET 480 8459 09:26 AM    PLATELET 031  02:02 AM       Recent Results (from the past 24 hour(s))   CBC WITH AUTOMATED DIFF    Collection Time: 20  9:26 AM   Result Value Ref Range    WBC 27.3 (H) 3.6 - 11.0 K/uL    RBC 3.76 (L) 3.80 - 5.20 M/uL    HGB 11.3 (L) 11.5 - 16.0 g/dL    HCT 32.9 (L) 35.0 - 47.0 %    MCV 87.5 80.0 - 99.0 FL    MCH 30.1 26.0 - 34.0 PG    MCHC 34.3 30.0 - 36.5 g/dL    RDW 12.8 11.5 - 14.5 %    PLATELET 767 748 - 583 K/uL    MPV 10.2 8.9 - 12.9 FL    NRBC 0.0 0  WBC    ABSOLUTE NRBC 0.00 0.00 - 0.01 K/uL    NEUTROPHILS 83 (H) 32 - 75 %    BAND NEUTROPHILS 9 %    LYMPHOCYTES 7 (L) 12 - 49 %    MONOCYTES 1 (L) 5 - 13 %    EOSINOPHILS 0 0 - 7 %    BASOPHILS 0 0 - 1 %    IMMATURE GRANULOCYTES 0 0.0 - 0.5 %    ABS. NEUTROPHILS 25.1 (H) 1.8 - 8.0 K/UL    ABS. LYMPHOCYTES 1.9 0.8 - 3.5 K/UL    ABS. MONOCYTES 0.3 0.0 - 1.0 K/UL    ABS. EOSINOPHILS 0.0 0.0 - 0.4 K/UL    ABS. BASOPHILS 0.0 0.0 - 0.1 K/UL    ABS. IMM.  GRANS. 0.0 0.00 - 0.04 K/UL    DF MANUAL      RBC COMMENTS NORMOCYTIC, NORMOCHROMIC

## 2020-02-09 NOTE — DISCHARGE INSTRUCTIONS
POST DELIVERY DISCHARGE INSTRUCTIONS    Name: An Quinones  YOB: 1990  Primary Diagnosis: Active Problems:    Leakage of amniotic fluid (2020)      Uterine contractions during pregnancy (2020)        General:     Diet/Diet Restrictions:  Eight 8-ounce glasses of fluid daily (water, juices); avoid excessive caffeine intake. Meals/snacks as desired which are high in fiber and carbohydrates and low in fat and cholesterol. Physical Activity / Restrictions / Safety:     Avoid heavy lifting, no more that 8 lbs. For 2-3 weeks; limit use of stairs to 2 times daily for the first week home. No driving for one week. Avoid intercourse 4-6 weeks, no douching or tampon use. Check with obstetrician before starting or resuming an exercise program.         Discharge Instructions/Special Treatment/Home Care Needs:     Continue prenatal vitamins. Continue to use squirt bottle with warm water on your episiotomy after each bathroom use until bleeding stops. If steri-strips applied to your incision, remove in 7-10 days. Call your doctor for the following:     Fever over 101 degrees by mouth. Vaginal bleeding heavier than a normal menstrual period or clot larger than a golf ball. Red streaks or increased swelling of legs, painful red streaks on your breast.  Painful urination, constipation and increased pain or swelling or discharge with your incision. If you feel extremely anxious or overwhelmed. If you have thoughts of harming yourself and/or your baby. Pain Management:     Pain Management:   Take Acetaminophen (Tylenol) or Ibuprofen (Advil, Motrin), as directed for pain. Use a warm Sitz bath 3 times daily to relieve episiotomy or hemorrhoidal discomfort. Heating pad to  incision as needed. For hemorrhoidal discomfort, use Tucks and Anusol cream as needed and directed. Follow-Up Care:      These are general instructions for a healthy lifestyle:    No smoking/ No tobacco products/ Avoid exposure to second hand smoke    Surgeon General's Warning:  Quitting smoking now greatly reduces serious risk to your health. Obesity, smoking, and sedentary lifestyle greatly increases your risk for illness    A healthy diet, regular physical exercise & weight monitoring are important for maintaining a healthy lifestyle    Recognize signs and symptoms of STROKE:    F-face looks uneven    A-arms unable to move or move unevenly    S-speech slurred or non-existent    T-time-call 911 as soon as signs and symptoms begin-DO NOT go       Back to bed or wait to see if you get better-TIME IS BRAIN. Patient Education        After Your Delivery (the Postpartum Period): Care Instructions  Your Care Instructions    Congratulations on the birth of your baby. Like pregnancy, the  period can be a time of excitement, petey, and exhaustion. You may look at your wondrous little baby and feel happy. You may also be overwhelmed by your new sleep hours and new responsibilities. At first, babies often sleep during the days and are awake at night. They do not have a pattern or routine. They may make sudden gasps, jerk themselves awake, or look like they have crossed eyes. These are all normal, and they may even make you smile. In these first weeks after delivery, try to take good care of yourself. It may take 4 to 6 weeks to feel like yourself again, and possibly longer if you had a  birth. You will likely feel very tired for several weeks. Your days will be full of ups and downs, but lots of petey as well. Follow-up care is a key part of your treatment and safety. Be sure to make and go to all appointments, and call your doctor if you are having problems. It's also a good idea to know your test results and keep a list of the medicines you take. How can you care for yourself at home?   Take care of your body after delivery  · Use pads instead of tampons for the bloody flow that may last as long as 2 weeks.  · Ease cramps with ibuprofen (Advil, Motrin). · Ease soreness of hemorrhoids and the area between your vagina and rectum with ice compresses or witch hazel pads. · Ease constipation by drinking lots of fluid and eating high-fiber foods. Ask your doctor about over-the-counter stool softeners. · Cleanse yourself with a gentle squeeze of warm water from a bottle instead of wiping with toilet paper. · Take a sitz bath in warm water several times a day. · Wear a good nursing bra. Ease sore and swollen breasts with warm, wet washcloths. · If you are not breastfeeding, use ice rather than heat for breast soreness. · Your period may not start for several months if you are breastfeeding. You may bleed more, and longer at first, than you did before you got pregnant. · Wait until you are healed (about 4 to 6 weeks) before you have sexual intercourse. Your doctor will tell you when it is okay to have sex. · Try not to travel with your baby for 5 or 6 weeks. If you take a long car trip, make frequent stops to walk around and stretch. Avoid exhaustion  · Rest every day. Try to nap when your baby naps. · Ask another adult to be with you for a few days after delivery. · Plan for  if you have other children. · Stay flexible so you can eat at odd hours and sleep when you need to. Both you and your baby are making new schedules. · Plan small trips to get out of the house. Change can make you feel less tired. · Ask for help with housework, cooking, and shopping. Remind yourself that your job is to care for your baby. Know about help for postpartum depression  · \"Baby blues\" are common for the first 1 to 2 weeks after birth. You may cry or feel sad or irritable for no reason. · Rest whenever you can. Being tired makes it harder to handle your emotions. · Go for walks with your baby. · Talk to your partner, friends, and family about your feelings.   · If your symptoms last for more than a few weeks, or if you feel very depressed, ask your doctor for help. · Postpartum depression can be treated. Support groups and counseling can help. Sometimes medicine can also help. Stay healthy  · Eat healthy foods so you have more energy and lose extra baby pounds. · If you breastfeed, avoid drugs. If you quit smoking during pregnancy, try to stay smoke-free. If you choose to have a drink now and then, have only one drink, and limit the number of occasions that you have a drink. Wait to breastfeed at least 2 hours after you have a drink to reduce the amount of alcohol the baby may get in the milk. · Start daily exercise after 4 to 6 weeks, but rest when you feel tired. · Learn exercises to tone your belly. Do Kegel exercises to regain strength in your pelvic muscles. You can do these exercises while you stand or sit. ? Squeeze the same muscles you would use to stop your urine. Your belly and thighs should not move. ? Hold the squeeze for 3 seconds, and then relax for 3 seconds. ? Start with 3 seconds. Then add 1 second each week until you are able to squeeze for 10 seconds. ? Repeat the exercise 10 to 15 times for each session. Do three or more sessions each day. · Find a class for new mothers and new babies that has an exercise time. · If you had a  birth, give yourself a bit more time before you exercise, and be careful. When should you call for help? Call 911 anytime you think you may need emergency care. For example, call if:    · You have thoughts of harming yourself, your baby, or another person.     · You passed out (lost consciousness).     · You have chest pain, are short of breath, or cough up blood.     · You have a seizure.    Call your doctor now or seek immediate medical care if:    · You have severe vaginal bleeding.  This means you are passing blood clots and soaking through a pad each hour for 2 or more hours.     · You are dizzy or lightheaded, or you feel like you may faint.     · You have a fever.     · You have new or more belly pain.     · You have signs of a blood clot in your leg (called a deep vein thrombosis), such as:  ? Pain in the calf, back of the knee, thigh, or groin. ? Redness and swelling in your leg or groin.     · You have signs of preeclampsia, such as:  ? Sudden swelling of your face, hands, or feet. ? New vision problems (such as dimness, blurring, or seeing spots). ? A severe headache.    Watch closely for changes in your health, and be sure to contact your doctor if:    · Your vaginal bleeding seems to be getting heavier.     · You have new or worse vaginal discharge.     · You feel sad, anxious, or hopeless for more than a few days.     · You do not get better as expected. Where can you learn more? Go to http://jassi-che.info/. Enter A461 in the search box to learn more about \"After Your Delivery (the Postpartum Period): Care Instructions. \"  Current as of: May 29, 2019  Content Version: 12.2  © 1644-4980 [employer]. Care instructions adapted under license by [facility] (which disclaims liability or warranty for this information). If you have questions about a medical condition or this instruction, always ask your healthcare professional. [address] any warranty or liability for your use of this information.

## 2020-02-09 NOTE — PROGRESS NOTES
Patient discharged to home with infant and significant other. Infant in carseat. Patient in wheelchair. Discharge bag, including diaper bag and supplies, and breastpump kit given. Prescriptions given x2 (norco, ibuprofen) . Discharge instructions reviewed with patient and significant other, signed by patient, and copies given. Per patient and significant other, no questions. Patient and infant bands verified. See infant note and/or footprint sheet on chart.

## 2020-02-09 NOTE — ROUTINE PROCESS
Bedside and Verbal shift change report given to Promise Davies RN  (oncoming nurse) by Mariposa Montesinos RN  (offgoing nurse). Report included the following information SBAR, Kardex, Intake/Output, MAR and Recent Results.

## 2020-02-09 NOTE — DISCHARGE SUMMARY
Obstetrical Discharge Summary     Name: Rosendo Rodriguez MRN: [id number]  SSN: [id number]    YOB: 1990  Age: 30 y.o. Sex: female      Admit Date: 2020    Discharge Date: 2020     Admitting Physician: Valerie Boone MD     Attending Physician:  Jamie Marte MD     Admission Diagnoses: Leakage of amniotic fluid [O42.90]  Uterine contractions during pregnancy [O62.2]    Discharge Diagnoses:   Information for the patient's :  Clifton Michelle [[id number]]   Delivery of a 3.73 kg female infant via Vaginal, Spontaneous on 2020 at 11:19 PM  by Chen Gata. Apgars were 8  and 9 . Additional Diagnoses:   Hospital Problems  Never Reviewed          Codes Class Noted POA    Leakage of amniotic fluid ICD-10-CM: O42.90  ICD-9-CM: 658.10  2020 Unknown        Uterine contractions during pregnancy ICD-10-CM: O62.2  ICD-9-CM: 661.20  2020 Unknown             Lab Results   Component Value Date/Time    Rubella, External Immune 2019    GrBStrep, External Negative 01/10/2020       Hospital Course: Patient had a postpartum hemorrhage immediately following delivery that resolved with medication. Her Hgb remains normal at 11.3 on postpartum day 1. She is asymptomatic and appropriate for discharge on postpartum day 2. Disposition at Discharge: Home or self care    Discharged Condition: Stable    Patient Instructions:   Current Discharge Medication List      START taking these medications    Details   HYDROcodone-acetaminophen (NORCO) 5-325 mg per tablet Take 1 Tab by mouth every six (6) hours as needed for Pain for up to 7 days. Max Daily Amount: 4 Tabs. Indications: pain  Qty: 16 Tab, Refills: 0    Associated Diagnoses: Postpartum pain      ibuprofen (MOTRIN) 800 mg tablet Take 1 Tab by mouth every eight (8) hours as needed for Pain.   Qty: 60 Tab, Refills: 1         CONTINUE these medications which have NOT CHANGED    Details   PNV No.40-Iron Fum-FA Cmb No.1 27-1 mg tab Take  by mouth. Reference my discharge instructions.     Follow-up Appointments   Procedures    FOLLOW UP VISIT Appointment in: 6 Weeks     Standing Status:   Standing     Number of Occurrences:   1     Order Specific Question:   Appointment in     Answer:   6 Weeks        Signed By:  Simone Snow MD     February 9, 2020

## 2020-07-15 ENCOUNTER — HOSPITAL ENCOUNTER (OUTPATIENT)
Dept: PHYSICAL THERAPY | Age: 30
Discharge: HOME | End: 2020-07-15
Payer: COMMERCIAL

## 2020-07-15 PROCEDURE — 97110 THERAPEUTIC EXERCISES: CPT | Performed by: PHYSICAL MEDICINE & REHABILITATION

## 2020-07-15 PROCEDURE — 97162 PT EVAL MOD COMPLEX 30 MIN: CPT | Performed by: PHYSICAL MEDICINE & REHABILITATION

## 2020-07-15 PROCEDURE — 97530 THERAPEUTIC ACTIVITIES: CPT | Performed by: PHYSICAL MEDICINE & REHABILITATION

## 2020-07-15 NOTE — PROGRESS NOTES
[address]  Phone: [phone]  Fax: [phone]    Plan of Care/ Statement of Necessity for Physical Therapy Services -15    Patient name: Maria Eugenia Reynolds  : 1990  Provider#: [id number]  Referral source: Radha Joshua MD      Medical/Treatment Diagnosis: Bilateral hip pain [M25.551, M25.552]     Prior Hospitalization: see medical history     Comorbidities: post  5 mos  Prior Level of Function: no pain with running   Medications: Verified on Patient Summary List    Start of Care: 7/15/20      Onset Date: 20       The Plan of Care and following information is based on the information from the initial evaluation. Assessment/ key information:  Pt referred to PFPT for evaluation and treatment of abdominal discomfort and heaviness with exercise post  5 mos. Pt presents with decreased pelvic floor ms strength, endurance and coordination on manual exam.  There is strength difference  in the R levator ani compared to L. She will benefit from skilled PT services to address her limitations and achieve her funcitonal goals      Evaluation Complexity History MEDIUM  Complexity : 1-2 comorbidities / personal factors will impact the outcome/ POC ; Examination MEDIUM Complexity : 3 Standardized tests and measures addressing body structure, function, activity limitation and / or participation in recreation  ;Presentation MEDIUM Complexity : Evolving with changing characteristics  ; Clinical Decision Making MEDIUM Complexity : FOTO score of 26-74  Overall Complexity Rating: MEDIUM    Problem List: pain affecting function, decrease strength, decrease ADL/ functional abilitiies, decrease activity tolerance and decrease flexibility/ joint mobility   Treatment Plan may include any combination of the following: Therapeutic exercise, Therapeutic activities, Neuromuscular re-education, Physical agent/modality, Manual therapy, Patient education, Self Care training and Functional mobility training  Patient / Family readiness to learn indicated by: asking questions  Persons(s) to be included in education: patient (P)  Barriers to Learning/Limitations: None  Patient Goal (s): return to running with no pain   Patient Self Reported Health Status: excellent  Rehabilitation Potential: excellent    Short Term Goals: To be accomplished in 6 weeks:  Patient will be independent with a progressive home exercise program    Patient will demonstrate & utilized pelvic floor ms protection techniques   Patient will demonstrate improved PFM strength to 3+/5 bilaterally in order to decrease UI symptoms   Patient will be independent with a progressive vaginal dilator therapy program  Patient will demonstrate 1 finger reduction in diastasis recti. Patient will run 1 mile with no abdominal discomfort      Long Term Goals: To be accomplished in 12 weeks:  Patient will demonstrate 4/5 PFM strength bilaterally in order to eliminate UI symptoms. Patient will demonstrate 10 second PFM holds at 4/5 in order to elminate UI symptoms. Patient will run 3 miles with no pain. Patient will have no pain with intercourse    Frequency / Duration: Patient to be seen 1 times per week for 6-12 weeks. Patient/ Caregiver education and instruction: self care and exercises    [x]  Plan of care has been reviewed with ANDRÉS White PT, MSPT     7/15/2020     ________________________________________________________________________    I certify that the above Therapy Services are being furnished while the patient is under my care. I agree with the treatment plan and certify that this therapy is necessary.     [facility] Signature:____________________  Date:____________Time: _________

## 2020-07-15 NOTE — PROGRESS NOTES
PT INITIAL EVALUATION NOTE 2-15    Patient Name: Caroline Stevens  Date:7/15/2020  : 1990  [x]  Patient  Verified  Payor: BLUE CROSS / Plan: [address] [place] [address] / Product Type: PPO /    In time: 80  Out time: 0345  Total Treatment Time (min): 60  Visit #: 1    Treatment Area: Bilateral hip pain [M25.551, M25.552]    SUBJECTIVE  Pain Level (0-10 scale):  0  Any medication changes, allergies to medications, adverse drug reactions, diagnosis change, or new procedure performed?: [] No    [x] Yes (see summary sheet for update)  Subjective:     Pt is 35year old female GIP1 post  20 with complaints of low abdominal discomfort and heaviness during and after running. She was able to slowly progress up to 3 mile distance but eventually stopped to due heaviness and discomfort during and after running. She is now walking 1 mile a day at most.  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 She has LBP related to scoliosis, she feels better when she is running. She is pumping and nursing exclusively. Her goal is to return to running        2nd degree tear. Pushed 3 1/2 hours, suction assist, devonte side up. She denies UI   She has a BM daily without straining, rated Norton 4. She has resumed IC but has pain with deep penetration.      PMH  Scoliosis   LBP     PLOF: no abdominal discomfort running  Mechanism of Injury: none  Previous Treatment/Compliance: none  PMHx/Surgical Hx:  - assisted  Work Hx: maternity leave  Living Situation: spouse, infant  Pt Goals: no   Barriers: none  Motivation: good  Substance use: none   FABQ Score: Reggie Female PFI  Cognition: A & O x 4        OBJECTIVE/EXAMINATION    JANELL:  2/3/1  (+) Gilettes test for relief of CC pain with palpation to abdominal ms wall at and above R rectus attachment     External Pelvic Exam  Non tender through external pelvic clock  No POP at rest or with sustained valsalva  Active contraction: present/hesitant  Active relaxation: absent     Internal Vaginal Exam:  Layer 1 non tender   Layer 2/3  Tender L levator ani with palpable hypertonicity  Bladder neck mobility:  NT    PERFECT SCORE CHART  P =  Power (Laycock Scale Grade 0-5) R 2  L 3  E =  Endurance (How long pt holds max contraction)  7 seconds  R =  Repetitions (How many times the repeats holds) 3 reps  F =  Fast Twitch (How many 1 second contractions in 10 seconds)  5 reps  E =  Elevation (Lift of post vaginal wall toward pubic bone) present  LEFT absent RIGHT  C =  Coordinated cocontraction of transverse abdominus present  T = Timing (squeeze and lift with cough) Absent      15 min Therapeutic Exercise:  [] See flow sheet :  PFMT up training with breath coordination, exhale with effort. Rationale: increase strength and improve coordination to improve the patients ability to run with no abdominal discomfort     30 min Therapeutic Activity:  []  See flow sheet :    Patient instructed in the role of physical therapy in the evaluation and treatment of pelvic floor dysfunction, applicable treatment modalities discussed. Expectations for regular home exercise participation and expected visit frequency in to achieve the patient's goals were discussed. Patient instructed in pelvic floor anatomy and function including levator ani, puborectalis and superficial pelvic  musculature. Patient was provided dietary information to improve stool quality including increasing soluble fiber intake (Bran Buds), probiotics (Activia yogurt) and increased water intake (6-8 glasses a day). Pt instructed in use of Gonzales stool chart to track progress. Patient educated in urinary urge suppression techniques including the KNACK, quick flicks, calmly walking rather than rushing to the toilet, nervous system down training.      Patient educated in proper defecation posture and technique including use of Squatty Potty for better puborectalis ms relaxation. Pt to avoid straining to pass stool in order to decrease strain on pelvic floor. Discussed expectations for staged PFM performance progress toward return to running. Rationale: increase strength and improve coordination  to improve the patients ability to return to running. With   [x] TE   [] TA   [] neuro   [] other: Patient Education: [x] Review HEP    [x] Progressed/Changed HEP based on:   [] positioning   [] body mechanics   [] transfers   [] heat/ice application    [] other:        Other Objective/Functional Measures: --     Pain Level (0-10 scale) post treatment:  0       ASSESSMENT: Pt referred to PFPT for evaluation and treatment of abdominal discomfort and heaviness with exercise post  5 mos. Pt presents with decreased pelvic floor ms strength, endurance and coordination on manual exam.  There is strength difference  in the R levator ani compared to L.   She will benefit from skilled PT services to address her limitations and achieve her funcitonal goals       [x]  See Plan of Roni Reeves PT,MSPT  7/15/2020

## 2020-07-20 ENCOUNTER — HOSPITAL ENCOUNTER (OUTPATIENT)
Dept: PHYSICAL THERAPY | Age: 30
Discharge: HOME | End: 2020-07-20
Payer: COMMERCIAL

## 2020-07-20 PROCEDURE — 97112 NEUROMUSCULAR REEDUCATION: CPT | Performed by: PHYSICAL MEDICINE & REHABILITATION

## 2020-07-20 PROCEDURE — 97110 THERAPEUTIC EXERCISES: CPT | Performed by: PHYSICAL MEDICINE & REHABILITATION

## 2020-07-20 NOTE — PROGRESS NOTES
PT DAILY TREATMENT NOTE 2-15    Patient Name: Alta Bonilla  Date:2020  : 1990  [x]  Patient  Verified  Payor: [employer] / Plan: [address] [place] [address] / Product Type: PPO /    In time: 745  Out time: 830  Total Treatment Time (min): 2  Visit #: 2  Treatment Area: Bilateral hip pain [M25.551, M25.552]    SUBJECTIVE  Pain Level (0-10 scale): 0  Any medication changes, allergies to medications, adverse drug reactions, diagnosis change, or new procedure performed?: [x] No    [] Yes (see summary sheet for update)  Subjective functional status/changes:   [] No changes reported  Doing well with HEP. Was able to run 1 mile with no abdominal discomfort. OBJECTIVE      30 min Therapeutic Exercise:  [] See flow sheet :      PFMT with emphasis on up training, breath coordination. Access Code: [email or web address]   URL: GREE/   Date: 2020   Prepared by: Yvonne Johnson     Exercises   Supine Diastasis Recti Correction with Sheet - 10 reps - 3 sets - 1x daily - 7x weekly   Supine Transversus Abdominis Bracing with Pelvic Floor Contraction - 5 reps - 5 sets - 1x daily - 7x weekly   Supine March with Posterior Pelvic Tilt - 5 reps - 5 sets - 1x daily - 7x weekly   Bent Knee Fallouts - 5 reps - 5 sets - 1x daily - 7x weekly      Rationale: increase strength and improve coordination to improve the patients ability to exercise, run with no difficulty      15 min Neuromuscular Re-education:  []  See flow sheet :  sEMG biofeedback with vaginal sensor, constant PT attendance for guidance. Rationale: increase strength, improve coordination and increase proprioception  to improve the patients ability to eliminate abdominal pain with exercise          With   [x] TE   [] TA   [x] neuro   [] other: Patient Education: [x] Review HEP    [x] Progressed/Changed HEP based on:   [x] positioning   [] body mechanics   [] transfers   [] heat/ice application    [x] other:  Advanced HEP.       Other Objective/Functional Measures:     sEMG Biofeedback Assessment:   10s Work Average 13.3  Max Effort 23.8  10s Rest Average 4.73  See readout in chart. Pain Level (0-10 scale) post treatment:  0    ASSESSMENT/Changes in Function:     Patient will continue to benefit from skilled PT services to modify and progress therapeutic interventions to attain remaining goals. [x]  See Plan of Care  []  See progress note/recertification  []  See Discharge Summary         Progress towards goals / Updated goals:  Able to advance exercises today with good tolerance. Pt continues to need instruction for correct exercise form and performance. Continues to demonstrate good potential to achieve functional goals stated on the plan of care.       PLAN  [x]  Upgrade activities as tolerated     []  Continue plan of care  []  Update interventions per flow sheet       []  Discharge due to:_  []  Other:_      Dimitri Huynh PT, MSPT  7/20/2020

## 2020-07-27 ENCOUNTER — HOSPITAL ENCOUNTER (OUTPATIENT)
Dept: PHYSICAL THERAPY | Age: 30
Discharge: HOME | End: 2020-07-27
Payer: COMMERCIAL

## 2020-07-27 PROCEDURE — 97110 THERAPEUTIC EXERCISES: CPT | Performed by: PHYSICAL MEDICINE & REHABILITATION

## 2020-07-27 NOTE — PROGRESS NOTES
PT DAILY TREATMENT NOTE 2-15    Patient Name: Loree Lino  Date:2020  : 1990  [x]  Patient  Verified  Payor: BLUE CROSS / Plan: [address] [place] [address] / Product Type: PPO /    In time: 745  Out time: 830  Total Treatment Time (min): 2  Visit #: 2  Treatment Area: Bilateral hip pain [M25.551, M25.552]    SUBJECTIVE  Pain Level (0-10 scale): 0  Any medication changes, allergies to medications, adverse drug reactions, diagnosis change, or new procedure performed?: [x] No    [] Yes (see summary sheet for update)  Subjective functional status/changes:   [] No changes reported  Was able to run 1 and 2  mile with no abdominal discomfort. Has not been doing HEP        OBJECTIVE      45 min Therapeutic Exercise:  [] See flow sheet :      PFMT with emphasis on up training, breath coordination. Access Code: [email or web address]   URL: [email or web address]   Date: 2020   Prepared by: Carol Ann Reilly     Exercises   Supine Diastasis Recti Correction with Sheet - 10 reps - 3 sets - 1x daily - 7x weekly   Supine Transversus Abdominis Bracing with Pelvic Floor Contraction - 5 reps - 5 sets - 1x daily - 7x weekly   Supine March with Posterior Pelvic Tilt - 5 reps - 5 sets - 1x daily - 7x weekly   Bent Knee Fallouts - 5 reps - 5 sets - 1x daily - 7x weekly     Added:        Rationale: increase strength and improve coordination to improve the patients ability to exercise, run with no difficulty             With   [x] TE   [] TA   [x] neuro   [] other: Patient Education: [x] Review HEP    [x] Progressed/Changed HEP based on:   [x] positioning   [] body mechanics   [] transfers   [] heat/ice application    [x] other:  Advanced HEP. Other Objective/Functional Measures:     sEMG Biofeedback Assessment:   10s Work Average 13.3  Max Effort 23.8  10s Rest Average 4.73  See readout in chart.       Pain Level (0-10 scale) post treatment:  0    ASSESSMENT/Changes in Function:     Patient will continue to benefit from skilled PT services to modify and progress therapeutic interventions to attain remaining goals. [x]  See Plan of Care  []  See progress note/recertification  []  See Discharge Summary         Progress towards goals / Updated goals:  Able to advance exercises today with good tolerance. Pt continues to need instruction for correct exercise form and performance. Continues to demonstrate good potential to achieve functional goals stated on the plan of care.       PLAN  [x]  Upgrade activities as tolerated     []  Continue plan of care  []  Update interventions per flow sheet       []  Discharge due to:_  []  Other:_      Steven Pagan PT, MSPT  7/27/2020

## 2020-08-05 ENCOUNTER — HOSPITAL ENCOUNTER (OUTPATIENT)
Dept: PHYSICAL THERAPY | Age: 30
Discharge: HOME | End: 2020-08-05
Payer: COMMERCIAL

## 2020-08-05 PROCEDURE — 97110 THERAPEUTIC EXERCISES: CPT | Performed by: PHYSICAL MEDICINE & REHABILITATION

## 2020-08-05 NOTE — PROGRESS NOTES
PT DAILY TREATMENT NOTE 2-15    Patient Name: Todd Barlow  Date:2020  : 1990  [x]  Patient  Verified  Payor: No Morris / Plan: [address] [place] [address] / Product Type: PPO /    In time: 915  Out time: 1000  Total Treatment Time (min): 45  Visit #: 4    Treatment Area: Bilateral hip pain [M25.551, M25.552]    SUBJECTIVE  Pain Level (0-10 scale):  0  Any medication changes, allergies to medications, adverse drug reactions, diagnosis change, or new procedure performed?: [x] No    [] Yes (see summary sheet for update)  Subjective functional status/changes:   [] No changes reported  Able to increase to 4 mile run with no difficulty. OBJECTIVE      45 min Therapeutic Exercise:  [] See flow sheet :    Access Code: [email or web address]   URL: [email or web address]   Date: 2020   Prepared by: Lester Shepherd     Exercises   Supine Bridge - 10 reps - 3 sets - 1x daily - 7x weekly   Bridge with Hip Abduction and Resistance - Ground Touches - 10 reps - 3 sets - 1x daily - 7x weekly   Supine Bridge with Mini Swiss Ball Between Knees - 10 reps - 3 sets - 1x daily - 7x weekly   Marching Bridge - 10 reps - 3 sets - 1x daily - 7x weekly   Bridge with Straight Leg Raise - 10 reps - 3 sets - 1x daily - 7x weekly   Supine 90/90 Abdominal Bracing - 10 reps - 3 sets - 1x daily - 7x weekly   Supine 90/90 Alternating Toe Touch - 10 reps - 3 sets - 1x daily - 7x weekly   Supine 90/90 with Leg Extensions - 10 reps - 3 sets - 1x daily - 7x weekly        Rationale: increase strength and improve coordination to improve the patients ability to return to distance running with no pain.              With   [x] TE   [] TA   [] neuro   [] other: Patient Education: [x] Review HEP    [x] Progressed/Changed HEP based on:   [x] positioning   [] body mechanics   [] transfers   [] heat/ice application    [] other:      Other Objective/Functional Measures:  --     Pain Level (0-10 scale) post treatment: 0     ASSESSMENT/Changes in Function:     Patient will continue to benefit from skilled PT services to modify and progress therapeutic interventions to attain remaining goals. [x]  See Plan of Care  []  See progress note/recertification  []  See Discharge Summary         Progress towards goals / Updated goals:  Able to advance exercises today with good tolerance. Pt continues to need instruction for correct exercise form and performance. Continues to demonstrate good potential to achieve functional goals stated on the plan of care. PLAN  [x]  Upgrade activities as tolerated     []  Continue plan of care  []  Update interventions per flow sheet       []  Discharge due to:_  [x]  Other:  Retest sEMG performance next week.        Shelby Gomez, PT, MSPT  8/5/2020

## 2020-08-12 ENCOUNTER — HOSPITAL ENCOUNTER (OUTPATIENT)
Dept: PHYSICAL THERAPY | Age: 30
Discharge: HOME | End: 2020-08-12
Payer: COMMERCIAL

## 2020-08-12 PROCEDURE — 97110 THERAPEUTIC EXERCISES: CPT | Performed by: PHYSICAL MEDICINE & REHABILITATION

## 2020-08-12 PROCEDURE — 97112 NEUROMUSCULAR REEDUCATION: CPT | Performed by: PHYSICAL MEDICINE & REHABILITATION

## 2020-08-12 NOTE — PROGRESS NOTES
[address] [facility] [place] Sheikh 58  Phone: [phone]  Fax: [phone]    Discharge Summary  2-15    Patient name: Jason Morales  : 1990  Provider#: [id number]  Referral source: Jay Jung MD      Medical/Treatment Diagnosis: Bilateral hip pain [M25.551, M25.552]     Prior Hospitalization: see medical history     Comorbidities: See Plan of Care  Prior Level of Function:See Plan of Care  Medications: Verified on Patient Summary List    Start of Care: 7/15/20      Onset Date: 20    Visits from Start of Care: 6     Missed Visits: 0  Reporting Period : 7/15/20  to 20    ASSESSMENT/SUMMARY OF CARE: Mrs Brian Bourgeois was referred to pelvic PT for evaluation and treatment of abdominal discomfort with running and exercise post . She was seen for 6 visits. Treatment consisted of manual therapy, therapeutic exercise, therapeutic activity, bladder health education, bowel habit education, biofeedback assisted pelvic floor training, home exercise program development and progression. Mrs Brian Bourgeois made steady gains with PT efforts, she demonstrates improvements in pelvic floor musculature recruitment, coordination and endurance on manual and sEMG biofeedback testing. She is able to perform high level core strengthening exercises and has completed 2 5K runs with no symptoms. She denies UI, pain with IC. JANELL decreased to 1.5     /She is discharged today to an ongoing progressive home exercise program with excellent results.      Short Term Goals: To be accomplished in 6 weeks:  Patient will be independent with a progressive home exercise program    MET  Patient will demonstrate & utilized pelvic floor ms protection techniques   MET  Patient will demonstrate improved PFM strength to 3+/5 bilaterally in order to decrease UI symptoms MET  Patient will be independent with a progressive vaginal dilator therapy program MET  Patient will demonstrate 1 finger reduction in diastasis recti. MET  Patient will run 1 mile with no abdominal discomfort   MET     Long Term Goals: To be accomplished in 12 weeks:  Patient will demonstrate 4/5 PFM strength bilaterally in order to eliminate UI symptoms. MET  Patient will demonstrate 10 second PFM holds at 4/5 in order to elminate UI symptoms. MET  Patient will run 3 miles with no pain.   MET  Patient will have no pain with intercourse  MET      RECOMMENDATIONS:  [x]Discontinue therapy: [x]Patient has reached or is progressing toward set goals      []Patient is non-compliant or has abdicated      []Due to lack of appreciable progress towards set goals      []Other    Lisa Pitt, PT, MSPT    8/12/2020

## 2020-08-12 NOTE — PROGRESS NOTES
PT DAILY TREATMENT NOTE 2-15    Patient Name: Luna Davis  Date:2020  : 1990  [x]  Patient  Verified  Payor: BLUE CROSS / Plan: [address] [place] [address] / Product Type: PPO /    In time: 46  Out time:  0845  Total Treatment Time (min): 60  Visit #: 6  Treatment Area: Bilateral hip pain [M25.551, M25.552]    SUBJECTIVE  Pain Level (0-10 scale):  0  Any medication changes, allergies to medications, adverse drug reactions, diagnosis change, or new procedure performed?: [x] No    [] Yes (see summary sheet for update)  Subjective functional status/changes:   [] No changes reported  Ran 2 5Ks with no abdominal pain. Has been doing HEP diligently. No LIZBETH/UUI. No pain with IC. OBJECTIVE    45 min Therapeutic Exercise:  [] See flow sheet :    Reviewed and practiced HEP. Access Code: [email or web address]   URL: [email or web address]   Date: 2020   Prepared by: Peyton Clifford     Exercises   Sidestepping with Pelvic Floor Contraction - 10 reps - 3 sets - 1x daily - 7x weekly   Squat with Chair Touch - 10 reps - 3 sets - 1x daily - 7x weekly   Partial Squats with Baby - 10 reps - 3 sets - 1x daily - 7x weekly   Sumo Squat with Dumbbell - 10 reps - 3 sets - 1x daily - 7x weekly   Single Leg Running Balance - 10 reps - 3 sets - 1x daily - 7x weekly   Single Leg Stance on Foam Pad - 10 reps - 3 sets - 1x daily - 7x weekly   Jump to Single Leg Stance - 10 reps - 3 sets - 1x daily - 7x weekly      Rationale: increase strength, improve coordination and improve balance to improve the patients ability to running long distances with no discomfort. 15 min Neuromuscular Re-education:  []  See flow sheet :   Rationale: increase strength, improve coordination and increase proprioception  to improve the patients ability to run long distances with no discomfort.            With   [x] TE   [] TA   [x] neuro   [] other: Patient Education: [x] Review HEP    [x] Progressed/Changed HEP based on:   [x] positioning   [x] body mechanics   [] transfers   [] heat/ice application    [x] other:  Advanced challenge to single leg stance and plyometrics. Perform pre run, mid run and post run [facility] as indicated. Other Objective/Functional Measures:     sEMG Biofeedback Assessment:   10s Work Average: 16.5mV  (improved)  Max Effort: 25.4mV  (improved)  10s Rest Average: 5.32mV (WNL)  See readout in chart. Pain Level (0-10 scale) post treatment: 0    ASSESSMENT/Changes in Function:    Able to advance running distance with no abdominal discomfort or UI. She was able to advance to single leg stance and plyometrics today with good tolerance. sEMG biofeedback testing demonstrates clear improvement in recruitment, coordination and endurance of PF musculature. Pt will continue to improve with HEP practice and slow increase in running distance. Ready for DC today. []  See Plan of Care  []  See progress note/recertification  [x]  See Discharge Summary         Progress towards goals / Updated goals:  See DC summary     PLAN  []  Upgrade activities as tolerated     []  Continue plan of care  []  Update interventions per flow sheet       [x]  Discharge due to: goals met.    []  Other:_      Eugenio Veronica, PT 8/12/2020

## 2020-08-19 ENCOUNTER — APPOINTMENT (OUTPATIENT)
Dept: PHYSICAL THERAPY | Age: 30
End: 2020-08-19
Payer: COMMERCIAL

## 2022-03-18 PROBLEM — O47.9 UTERINE CONTRACTIONS DURING PREGNANCY: Status: ACTIVE | Noted: 2020-02-07

## 2022-03-19 PROBLEM — O42.90 LEAKAGE OF AMNIOTIC FLUID: Status: ACTIVE | Noted: 2020-02-07

## 2022-04-28 LAB
ANTIBODY SCREEN, EXTERNAL: NEGATIVE
CHLAMYDIA, EXTERNAL: NEGATIVE
HBSAG, EXTERNAL: NEGATIVE
HIV, EXTERNAL: NEGATIVE
N. GONORRHEA, EXTERNAL: NEGATIVE
RPR, EXTERNAL: NORMAL
RUBELLA, EXTERNAL: NORMAL

## 2022-11-15 LAB — GRBS, EXTERNAL: NEGATIVE

## 2022-12-14 ENCOUNTER — ANESTHESIA EVENT (OUTPATIENT)
Dept: LABOR AND DELIVERY | Age: 32
End: 2022-12-14
Payer: COMMERCIAL

## 2022-12-14 ENCOUNTER — ANESTHESIA (OUTPATIENT)
Dept: LABOR AND DELIVERY | Age: 32
End: 2022-12-14
Payer: COMMERCIAL

## 2022-12-14 ENCOUNTER — HOSPITAL ENCOUNTER (INPATIENT)
Age: 32
LOS: 3 days | Discharge: HOME | End: 2022-12-17
Attending: OBSTETRICS & GYNECOLOGY | Admitting: STUDENT IN AN ORGANIZED HEALTH CARE EDUCATION/TRAINING PROGRAM
Payer: COMMERCIAL

## 2022-12-14 PROBLEM — Z34.90 PREGNANCY: Status: ACTIVE | Noted: 2022-12-14

## 2022-12-14 LAB
BASOPHILS # BLD: 0.1 K/UL (ref 0–0.1)
BASOPHILS NFR BLD: 0 % (ref 0–1)
DIFFERENTIAL METHOD BLD: ABNORMAL
EOSINOPHIL # BLD: 0.1 K/UL (ref 0–0.4)
EOSINOPHIL NFR BLD: 1 % (ref 0–7)
ERYTHROCYTE [DISTWIDTH] IN BLOOD BY AUTOMATED COUNT: 12.7 % (ref 11.5–14.5)
HCT VFR BLD AUTO: 35.3 % (ref 35–47)
HGB BLD-MCNC: 12.1 G/DL (ref 11.5–16)
IMM GRANULOCYTES # BLD AUTO: 0.1 K/UL (ref 0–0.04)
IMM GRANULOCYTES NFR BLD AUTO: 0 % (ref 0–0.5)
LYMPHOCYTES # BLD: 3.1 K/UL (ref 0.8–3.5)
LYMPHOCYTES NFR BLD: 25 % (ref 12–49)
MCH RBC QN AUTO: 30 PG (ref 26–34)
MCHC RBC AUTO-ENTMCNC: 34.3 G/DL (ref 30–36.5)
MCV RBC AUTO: 87.6 FL (ref 80–99)
MONOCYTES # BLD: 0.8 K/UL (ref 0–1)
MONOCYTES NFR BLD: 7 % (ref 5–13)
NEUTS SEG # BLD: 8.4 K/UL (ref 1.8–8)
NEUTS SEG NFR BLD: 67 % (ref 32–75)
NRBC # BLD: 0 K/UL (ref 0–0.01)
NRBC BLD-RTO: 0 PER 100 WBC
PLATELET # BLD AUTO: 260 K/UL (ref 150–400)
PMV BLD AUTO: 10.7 FL (ref 8.9–12.9)
RBC # BLD AUTO: 4.03 M/UL (ref 3.8–5.2)
WBC # BLD AUTO: 12.5 K/UL (ref 3.6–11)

## 2022-12-14 PROCEDURE — 85025 COMPLETE CBC W/AUTO DIFF WBC: CPT

## 2022-12-14 PROCEDURE — 75410000002 HC LABOR FEE PER 1 HR: Performed by: ADVANCED PRACTICE MIDWIFE

## 2022-12-14 PROCEDURE — 2709999900 HC NON-CHARGEABLE SUPPLY

## 2022-12-14 PROCEDURE — 36415 COLL VENOUS BLD VENIPUNCTURE: CPT

## 2022-12-14 PROCEDURE — 99284 EMERGENCY DEPT VISIT MOD MDM: CPT

## 2022-12-14 PROCEDURE — 59025 FETAL NON-STRESS TEST: CPT

## 2022-12-14 PROCEDURE — 65270000029 HC RM PRIVATE

## 2022-12-14 PROCEDURE — 86901 BLOOD TYPING SEROLOGIC RH(D): CPT

## 2022-12-14 RX ORDER — NALOXONE HYDROCHLORIDE 0.4 MG/ML
0.4 INJECTION, SOLUTION INTRAMUSCULAR; INTRAVENOUS; SUBCUTANEOUS AS NEEDED
Status: DISCONTINUED | OUTPATIENT
Start: 2022-12-14 | End: 2022-12-15 | Stop reason: SDUPTHER

## 2022-12-14 RX ORDER — FENTANYL/BUPIVACAINE/NS/PF 2-1250MCG
1-16 PREFILLED PUMP RESERVOIR EPIDURAL CONTINUOUS
Status: DISCONTINUED | OUTPATIENT
Start: 2022-12-15 | End: 2022-12-15 | Stop reason: HOSPADM

## 2022-12-14 RX ORDER — ONDANSETRON 2 MG/ML
8 INJECTION INTRAMUSCULAR; INTRAVENOUS
Status: DISCONTINUED | OUTPATIENT
Start: 2022-12-14 | End: 2022-12-15 | Stop reason: HOSPADM

## 2022-12-14 RX ORDER — OXYTOCIN/RINGER'S LACTATE 30/500 ML
PLASTIC BAG, INJECTION (ML) INTRAVENOUS
Status: COMPLETED
Start: 2022-12-14 | End: 2022-12-15

## 2022-12-14 RX ORDER — DOCUSATE SODIUM 100 MG/1
100 CAPSULE, LIQUID FILLED ORAL
Status: DISCONTINUED | OUTPATIENT
Start: 2022-12-14 | End: 2022-12-15 | Stop reason: SDUPTHER

## 2022-12-14 RX ORDER — ACETAMINOPHEN 325 MG/1
650 TABLET ORAL
Status: DISCONTINUED | OUTPATIENT
Start: 2022-12-14 | End: 2022-12-15 | Stop reason: SDUPTHER

## 2022-12-14 RX ORDER — CALCIUM CARBONATE 200(500)MG
400 TABLET,CHEWABLE ORAL
Status: DISCONTINUED | OUTPATIENT
Start: 2022-12-14 | End: 2022-12-15 | Stop reason: HOSPADM

## 2022-12-14 RX ORDER — MINERAL OIL
120 OIL (ML) ORAL ONCE
Status: DISCONTINUED | OUTPATIENT
Start: 2022-12-14 | End: 2022-12-15 | Stop reason: HOSPADM

## 2022-12-15 LAB
ABO + RH BLD: NORMAL
BLOOD GROUP ANTIBODIES SERPL: NORMAL
SPECIMEN EXP DATE BLD: NORMAL

## 2022-12-15 PROCEDURE — 4A1HXCZ MONITORING OF PRODUCTS OF CONCEPTION, CARDIAC RATE, EXTERNAL APPROACH: ICD-10-PCS | Performed by: STUDENT IN AN ORGANIZED HEALTH CARE EDUCATION/TRAINING PROGRAM

## 2022-12-15 PROCEDURE — 75410000000 HC DELIVERY VAGINAL/SINGLE: Performed by: ADVANCED PRACTICE MIDWIFE

## 2022-12-15 PROCEDURE — 65270000029 HC RM PRIVATE

## 2022-12-15 PROCEDURE — 74011250636 HC RX REV CODE- 250/636

## 2022-12-15 PROCEDURE — 74011250637 HC RX REV CODE- 250/637

## 2022-12-15 PROCEDURE — 0HQ9XZZ REPAIR PERINEUM SKIN, EXTERNAL APPROACH: ICD-10-PCS | Performed by: STUDENT IN AN ORGANIZED HEALTH CARE EDUCATION/TRAINING PROGRAM

## 2022-12-15 PROCEDURE — 74011000250 HC RX REV CODE- 250: Performed by: ANESTHESIOLOGY

## 2022-12-15 PROCEDURE — 00HU33Z INSERTION OF INFUSION DEVICE INTO SPINAL CANAL, PERCUTANEOUS APPROACH: ICD-10-PCS | Performed by: ANESTHESIOLOGY

## 2022-12-15 PROCEDURE — 77030019905 HC CATH URETH INTMIT MDII -A

## 2022-12-15 PROCEDURE — 77030014125 HC TY EPDRL BBMI -B: Performed by: ANESTHESIOLOGY

## 2022-12-15 PROCEDURE — 75410000002 HC LABOR FEE PER 1 HR: Performed by: ADVANCED PRACTICE MIDWIFE

## 2022-12-15 PROCEDURE — 75410000003 HC RECOV DEL/VAG/CSECN EA 0.5 HR: Performed by: ADVANCED PRACTICE MIDWIFE

## 2022-12-15 PROCEDURE — 74011250636 HC RX REV CODE- 250/636: Performed by: STUDENT IN AN ORGANIZED HEALTH CARE EDUCATION/TRAINING PROGRAM

## 2022-12-15 PROCEDURE — 36415 COLL VENOUS BLD VENIPUNCTURE: CPT

## 2022-12-15 PROCEDURE — 74011250637 HC RX REV CODE- 250/637: Performed by: STUDENT IN AN ORGANIZED HEALTH CARE EDUCATION/TRAINING PROGRAM

## 2022-12-15 PROCEDURE — 77010026065 HC OXYGEN MINIMUM MEDICAL AIR: Performed by: ADVANCED PRACTICE MIDWIFE

## 2022-12-15 RX ORDER — NALOXONE HYDROCHLORIDE 0.4 MG/ML
0.4 INJECTION, SOLUTION INTRAMUSCULAR; INTRAVENOUS; SUBCUTANEOUS AS NEEDED
Status: DISCONTINUED | OUTPATIENT
Start: 2022-12-15 | End: 2022-12-16 | Stop reason: ALTCHOICE

## 2022-12-15 RX ORDER — SODIUM CHLORIDE 0.9 % (FLUSH) 0.9 %
5-40 SYRINGE (ML) INJECTION AS NEEDED
Status: DISCONTINUED | OUTPATIENT
Start: 2022-12-15 | End: 2022-12-15

## 2022-12-15 RX ORDER — LIDOCAINE HYDROCHLORIDE AND EPINEPHRINE 15; 5 MG/ML; UG/ML
INJECTION, SOLUTION EPIDURAL
Status: SHIPPED | OUTPATIENT
Start: 2022-12-15 | End: 2022-12-15

## 2022-12-15 RX ORDER — OXYTOCIN/RINGER'S LACTATE 30/500 ML
10 PLASTIC BAG, INJECTION (ML) INTRAVENOUS AS NEEDED
Status: COMPLETED | OUTPATIENT
Start: 2022-12-15 | End: 2022-12-15

## 2022-12-15 RX ORDER — DOCUSATE SODIUM 100 MG/1
100 CAPSULE, LIQUID FILLED ORAL
Status: DISCONTINUED | OUTPATIENT
Start: 2022-12-15 | End: 2022-12-17 | Stop reason: HOSPADM

## 2022-12-15 RX ORDER — OXYTOCIN/RINGER'S LACTATE 30/500 ML
0-20 PLASTIC BAG, INJECTION (ML) INTRAVENOUS
Status: DISCONTINUED | OUTPATIENT
Start: 2022-12-15 | End: 2022-12-15

## 2022-12-15 RX ORDER — SODIUM CHLORIDE, SODIUM LACTATE, POTASSIUM CHLORIDE, CALCIUM CHLORIDE 600; 310; 30; 20 MG/100ML; MG/100ML; MG/100ML; MG/100ML
125 INJECTION, SOLUTION INTRAVENOUS CONTINUOUS
Status: DISCONTINUED | OUTPATIENT
Start: 2022-12-15 | End: 2022-12-16 | Stop reason: ALTCHOICE

## 2022-12-15 RX ORDER — MISOPROSTOL 200 UG/1
1000 TABLET ORAL ONCE
Status: COMPLETED | OUTPATIENT
Start: 2022-12-15 | End: 2022-12-15

## 2022-12-15 RX ORDER — MISOPROSTOL 200 UG/1
TABLET ORAL
Status: COMPLETED
Start: 2022-12-15 | End: 2022-12-15

## 2022-12-15 RX ORDER — OXYTOCIN/RINGER'S LACTATE 30/500 ML
10 PLASTIC BAG, INJECTION (ML) INTRAVENOUS AS NEEDED
Status: DISCONTINUED | OUTPATIENT
Start: 2022-12-15 | End: 2022-12-16 | Stop reason: ALTCHOICE

## 2022-12-15 RX ORDER — FOLIC ACID/MULTIVIT,IRON,MINER 0.4MG-18MG
1 TABLET ORAL DAILY
Status: DISCONTINUED | OUTPATIENT
Start: 2022-12-15 | End: 2022-12-17 | Stop reason: HOSPADM

## 2022-12-15 RX ORDER — ACETAMINOPHEN 325 MG/1
650 TABLET ORAL
Status: DISCONTINUED | OUTPATIENT
Start: 2022-12-15 | End: 2022-12-16

## 2022-12-15 RX ORDER — OXYTOCIN/RINGER'S LACTATE 30/500 ML
87.3 PLASTIC BAG, INJECTION (ML) INTRAVENOUS AS NEEDED
Status: DISCONTINUED | OUTPATIENT
Start: 2022-12-15 | End: 2022-12-15

## 2022-12-15 RX ORDER — SODIUM CHLORIDE 0.9 % (FLUSH) 0.9 %
5-40 SYRINGE (ML) INJECTION EVERY 8 HOURS
Status: DISCONTINUED | OUTPATIENT
Start: 2022-12-15 | End: 2022-12-15

## 2022-12-15 RX ORDER — ONDANSETRON 2 MG/ML
4 INJECTION INTRAMUSCULAR; INTRAVENOUS
Status: DISCONTINUED | OUTPATIENT
Start: 2022-12-15 | End: 2022-12-15

## 2022-12-15 RX ORDER — SIMETHICONE 80 MG
80 TABLET,CHEWABLE ORAL
Status: DISCONTINUED | OUTPATIENT
Start: 2022-12-15 | End: 2022-12-17 | Stop reason: HOSPADM

## 2022-12-15 RX ORDER — OXYTOCIN/RINGER'S LACTATE 30/500 ML
87.3 PLASTIC BAG, INJECTION (ML) INTRAVENOUS AS NEEDED
Status: DISCONTINUED | OUTPATIENT
Start: 2022-12-15 | End: 2022-12-16 | Stop reason: ALTCHOICE

## 2022-12-15 RX ORDER — BUPIVACAINE HYDROCHLORIDE 2.5 MG/ML
INJECTION, SOLUTION EPIDURAL; INFILTRATION; INTRACAUDAL
Status: SHIPPED | OUTPATIENT
Start: 2022-12-15 | End: 2022-12-15

## 2022-12-15 RX ORDER — METHYLERGONOVINE MALEATE 0.2 MG/ML
INJECTION INTRAVENOUS
Status: COMPLETED
Start: 2022-12-15 | End: 2022-12-15

## 2022-12-15 RX ORDER — LIDOCAINE HYDROCHLORIDE 10 MG/ML
INJECTION INFILTRATION; PERINEURAL AS NEEDED
Status: DISCONTINUED | OUTPATIENT
Start: 2022-12-15 | End: 2022-12-15 | Stop reason: HOSPADM

## 2022-12-15 RX ORDER — IBUPROFEN 800 MG/1
800 TABLET ORAL
Status: DISCONTINUED | OUTPATIENT
Start: 2022-12-15 | End: 2022-12-16

## 2022-12-15 RX ORDER — METHYLERGONOVINE MALEATE 0.2 MG/ML
0.2 INJECTION INTRAVENOUS ONCE
Status: COMPLETED | OUTPATIENT
Start: 2022-12-15 | End: 2022-12-15

## 2022-12-15 RX ORDER — DIPHENHYDRAMINE HYDROCHLORIDE 50 MG/ML
25 INJECTION, SOLUTION INTRAMUSCULAR; INTRAVENOUS
Status: DISCONTINUED | OUTPATIENT
Start: 2022-12-15 | End: 2022-12-17 | Stop reason: HOSPADM

## 2022-12-15 RX ADMIN — MUPIROCIN: 20 OINTMENT TOPICAL at 09:56

## 2022-12-15 RX ADMIN — IBUPROFEN 800 MG: 800 TABLET, FILM COATED ORAL at 06:12

## 2022-12-15 RX ADMIN — Medication 1 TABLET: at 09:56

## 2022-12-15 RX ADMIN — ACETAMINOPHEN 650 MG: 325 TABLET ORAL at 18:30

## 2022-12-15 RX ADMIN — METHYLERGONOVINE MALEATE 0.2 MG: 0.2 INJECTION INTRAVENOUS at 03:59

## 2022-12-15 RX ADMIN — ACETAMINOPHEN 650 MG: 325 TABLET ORAL at 09:56

## 2022-12-15 RX ADMIN — OXYTOCIN 10000 MILLI-UNITS: 10 INJECTION, SOLUTION INTRAMUSCULAR; INTRAVENOUS at 03:49

## 2022-12-15 RX ADMIN — IBUPROFEN 800 MG: 800 TABLET, FILM COATED ORAL at 14:10

## 2022-12-15 RX ADMIN — LIDOCAINE HYDROCHLORIDE AND EPINEPHRINE 3.5 ML: 15; 5 INJECTION, SOLUTION EPIDURAL at 00:42

## 2022-12-15 RX ADMIN — IBUPROFEN 800 MG: 800 TABLET, FILM COATED ORAL at 22:22

## 2022-12-15 RX ADMIN — Medication 2 MILLI-UNITS/MIN: at 01:24

## 2022-12-15 RX ADMIN — MISOPROSTOL 1000 MCG: 200 TABLET ORAL at 04:04

## 2022-12-15 RX ADMIN — ACETAMINOPHEN 650 MG: 325 TABLET ORAL at 14:10

## 2022-12-15 RX ADMIN — ACETAMINOPHEN 650 MG: 325 TABLET ORAL at 22:22

## 2022-12-15 RX ADMIN — Medication 12 ML/HR: at 01:40

## 2022-12-15 RX ADMIN — OXYTOCIN 2 MILLI-UNITS/MIN: 10 INJECTION, SOLUTION INTRAMUSCULAR; INTRAVENOUS at 01:24

## 2022-12-15 RX ADMIN — BUPIVACAINE HYDROCHLORIDE 10 ML: 2.5 INJECTION, SOLUTION EPIDURAL; INFILTRATION; INTRACAUDAL; PERINEURAL at 00:44

## 2022-12-15 RX ADMIN — METHYLERGONOVINE MALEATE 0.2 MG: 0.2 INJECTION, SOLUTION INTRAMUSCULAR; INTRAVENOUS at 03:59

## 2022-12-15 RX ADMIN — LIDOCAINE HYDROCHLORIDE 1.5 MG: 10 INJECTION, SOLUTION INFILTRATION; PERINEURAL at 00:40

## 2022-12-15 NOTE — PROGRESS NOTES
Labor Note    [facility]  [id number]  1990   41w0d      S:  Feeling much better with epidural, resting    O:    Visit Vitals  /80 (BP 1 Location: Left upper arm, BP Patient Position: At rest)   Pulse 80   Temp 98.1 °F (36.7 °C)   Resp 16   Ht 5' 5\" (1.651 m)   Wt 77.6 kg (171 lb)   SpO2 99%   BMI 28.46 kg/m²     Cervical Exam  Dilation (cm): 9  Eff: 80 %  Station: 0  Vaginal exam done by? : Esthela Powell CNM  Membrane Status: SROM    Patient Vitals for the past 4 hrs: Mode Fetal Heart Rate Variability Decelerations Accelerations RN Reviewed Strip?   12/15/22 0100 External 110 6-25 BPM None No Yes   12/15/22 0030 -- 95 -- -- -- Yes   12/15/22 0025 -- 105 -- -- -- Yes   12/15/22 0020 -- 105 -- -- -- Yes   12/15/22 0015 -- 105 -- -- -- Yes   12/15/22 0010 -- -- -- -- -- Yes   12/15/22 0005 -- 130 -- -- -- Yes   12/15/22 0000 -- 130 -- -- -- Yes   22 2355 -- 130 -- -- -- Yes   22 2350 -- 130 -- -- -- Yes   22 2345 -- 130 -- -- -- Yes   22 2340 -- 130 -- -- -- Yes   22 2330 External 130 6-25 BPM None Yes Yes   22 2300 External 150 (!) >25 BPM Variable Yes Yes   22 2230 External 155 6-25 BPM None Yes Yes   22 2200 External 145 6-25 BPM None Yes Yes       A/P:  33 y.o.  @ 41w0d - labor   -SROM, progressed to anterior lip per CNM exam  -pushing with CNM but unable to push past lip, pt uncomfortable so PCEA placed, pt comfortable now  -Ctx q5-6 mins, start pitocin then resume pushing    Heladio Glasgow MD  [facility]        -----------------------------------------------    02:15 Pt declines to start pushing.  Would like 30 more mins to rest

## 2022-12-15 NOTE — ANESTHESIA PROCEDURE NOTES
Epidural Block    Patient location during procedure: OB  Start time: 12/15/2022 12:40 AM  End time: 12/15/2022 12:46 AM  Reason for block: labor epidural  Staffing  Performed: attending   Anesthesiologist: Jonas Caicedo MD  Preanesthetic Checklist  Completed: patient identified, IV checked, site marked, risks and benefits discussed, surgical consent, monitors and equipment checked, pre-op evaluation, timeout performed and fire risk safety assessment completed and verbalized  Block Placement  Patient position: sitting  Prep: ChloraPrep  Sterility prep: mask, gloves, drape and cap  Sedation level: no sedation  Patient monitoring: heart rate, frequent blood pressure checks and continuous pulse oximetry  Approach: midline  Location: lumbar  Epidural  Loss of resistance technique: hanging drop and saline  Guidance: landmark technique  Needle  Needle type: Tuohy   Needle gauge: 17 G  Needle length: 10 cm  Needle insertion depth: 7 cm  Catheter type: end hole  Catheter size: 20 G  Catheter at skin depth: 12 cm  Catheter securement method: surgical tape and clear occlusive dressing  Test dose: negative  Medications Administered  lidocaine-EPINEPHrine (XYLOCAINE) 1.5 %-1:200,000 Epidural - Epidural   3.5 mL - 12/15/2022 12:42:00 AM  bupivacaine (PF) (MARCAINE) 0.25% Epidural - Epidural   10 mL - 12/15/2022 12:44:00 AM  Assessment  Number of attempts: 1  Procedure assessment: patient tolerated procedure well with no immediate complications  Additional Notes  Aseptic, atraumatic technique

## 2022-12-15 NOTE — DISCHARGE INSTRUCTIONS
Discharge Instructions for Vaginal Delivery    Patient ID:  Rosmery Jj  [id number]  33 y.o.  1990    Take Home Medications       Continue taking your prenatal vitamins if you are breastfeeding. Follow-up care is a key part of your treatment and safety. Please schedule and keep appointments. Follow-up with your primary OB in 6 weeks. Activity  Avoid anything in your vagina for 6 weeks (no intercourse, tampons, or douching). You may drive unless you are taking prescription pain medications. Climbing stairs and light lifting are okay. Please avoid excessive exercise, though walking is okay- you'll be tired! Diet  Regular diet as tolerated. Be sure to drink plenty of fluids if you are breastfeeding. Wound care  If you have stitches, continue to rinse with a squirt bottle of warm water each time you void for about 7-10 days. .  Your stitches will gradually dissolve over four to eight weeks. Sitz baths are also helpful to keep the wound clean, encourage healing, and to help with pain associated with the stitches or hemorrhoids. You can use either a sitz bath basin or a bathtub filled with 2-3\" inches of plain warm water. Soak for 10 minutes 3 times a day as tolerated. Pain Management  Over the counter medications such as Tylenol and ibuprofen (Motrin or Advil) are ideal.  These may be taken together, alternating doses. You may  take the maximum dose:  Motrin or Advil (generic ibuprofen), either 3 tablets every 6 hours or 4 tablets every 8 hours or Tylenol (acetominophen) 1000mg every 6 hours (equivalent to 2 extra strength Tylenol). You may also have a precrescription for stronger pain medication. Take only as needed and transition to over the counter medication in the next few days. Minimize amounts of the prescription medication, as it can be habit-forming and will worsen or cause constipation.  Most patients will find that within a couple of days, their pain is adequately controlled using only over-the-counter medications. The prescription pain medication is mixed with Tylenol, therefore, you should not take any extra Tylenol or acetaminophen until you have reduced your prescription pain medication. Add heating pad or sitz baths as needed. Add hemorrhoid wipes or ointments if needed    Constipation  Constipation is normal after pregnancy and delivery, especially while taking prescription narcotic pain medication. Over the counter remedies including ducosate (Colace), take 1-2 capsules 1-2 times daily for soft stool as needed. You may also add/ try milk of magnesia or rectal remedies such as Dulcolax or Fleets enema. Recovery: What to Expect at Home  Fatigue is expected. Try to rest when you can and don't worry about doing housework or other tasks which can wait. The soreness along your bottom will improve significantly over the first 2 weeks, but it may take 6 weeks before you are completely recovered. Back pain or general body aches or muscle soreness are expected and should improve with acetominophen or ibuprofen. Leg swelling due to pregnancy and/or IV fluids given in the hospital will take about two weeks to resolve. Most women experience some form of the \"Baby Blues\" after having a baby. Feeling emotional, tearful, frustrated, anxious, sad, and irritable some of the time is normal and go away after about 2 weeks. Adequate rest and help from your family will help. Take breaks from caring for the baby. Call your doctor if your symptoms seem severe, last more than 2 weeks, or seem to be getting worse instead of better. Get help immediately if you have thoughts of wanting to hurt yourself or others! Call your doctor or seek immediate medical care if you have:  Heavy vaginal bleeding, soaking through one or more pads an hour for several hours. Foul-smelling discharge from your vagina or incision. Consistent nausea and vomiting and cannot keep fluids down.   Consistent pain that does not get better after you take pain medicine.   Sudden chest pain and shortness of breath  Signs of a blood clot: pain/ swelling/ increasing redness in your lower extremeties  Signs of infection: increased pain in your abdomen or vaginal area; red streaks, warmth, or tenderness of your breasts; fever of 100.5 F or greater

## 2022-12-15 NOTE — LACTATION NOTE
This note was copied from a baby's chart. Discussed with mother her plan for feeding. Reviewed the benefits of exclusive breast milk feeding during the hospital stay. Informed her of the risks of using formula to supplement in the first few days of life as well as the benefits of successful breast milk feeding; referred her to the Breastfeeding booklet about this information. She acknowledges understanding of information reviewed and states that it is her plan to breastfeed her infant. Will support her choice and offer additional information as needed. Reviewed breastfeeding basics:  How milk is made and normal  breastfeeding behaviors discussed. Supply and demand,  stomach size, early feeding cues, skin to skin bonding with comfortable positioning and baby led latch-on reviewed. How to identify signs of successful breastfeeding sessions reviewed; education on asymetrical latch, signs of effective latching vs shallow, in-effective latching, normal  feeding frequency and duration and expected infant output discussed. Normal course of breastfeeding discussed including the AAP's recommendation that children receive exclusive breast milk feedings for the first six months of life with breast milk feedings to continue through the first year of life and/or beyond as complimentary table foods are added. Breastfeeding Booklet and Warm line information provided with discussion. Discussed typical  weight loss and the importance of pediatrician appointment within 24-48 hours of discharge, at 2 weeks of life and normalcy of requesting pediatric weight checks as needed in between visits. Hand Expression Education:  Mom taught how to manually hand express her colostrum. Emphasized the importance of providing infant with valuable colostrum as infant rests skin to skin at breast.  Aware to avoid extended periods of non-feeding.   Aware to offer 10-20+ drops of colostrum every 2-3 hours until infant is latching and nursing effectively. Taught the rationale behind this low tech but highly effective evidence based practice. Pt will successfully establish breastfeeding by feeding in response to early feeding cues   or wake every 3h, will obtain deep latch, and will keep log of feedings/output. Taught to BF at hunger cues and or q 2-3 hrs and to offer 10-20 drops of hand expressed colostrum at any non-feeds.       Breast Assessment  Left Breast: Medium  Left Nipple: Everted, Intact  Right Breast: Medium  Right Nipple: Everted, Intact  Breast- Feeding Assessment  Attends Breast-Feeding Classes: No  Breast-Feeding Experience: Yes  Breast Trauma/Surgery: No  Type/Quality: Good  Lactation Consultant Visits  Breast-Feedings: Good   Mother/Infant Observation  Mother Observation: Breast comfortable, Holds breast, Lets baby end feeding, Nipple round on release, Recognizes feeding cues  Infant Observation: Rhythmic suck, Relaxed after feeding, Opens mouth, Lips flanged, upper, Lips flanged, lower, Latches nipple and aereolae, Feeding cues, Audible swallows  LATCH Documentation  Latch: Grasps breast, tongue down, lips flanged, rhythmic sucking  Audible Swallowing: Spontaneous and intermittent (24 hours old)  Type of Nipple: Everted (after stimulation)  Comfort (Breast/Nipple): Soft/non-tender  Hold (Positioning): Full assist, teach one side, mother does other, staff holds  [facility] Score: 9

## 2022-12-15 NOTE — PROGRESS NOTES
[facility] Labor Progress Note     Patient: Stuart Nogueira MRN: [id number]  SSN: [id number]    YOB: 1990  Age: 33 y.o. Sex: female        Subjective:   Pt reports desires to push       Objective:   Patient Vitals for the past 4 hrs:   Temp Pulse Resp BP SpO2   12/15/22 0453 98.4 °F (36.9 °C) 79 16 124/67 98 %   12/15/22 0438 98.7 °F (37.1 °C) 75 16 123/61 97 %   12/15/22 0423 98.7 °F (37.1 °C) 79 16 122/68 99 %   12/15/22 0406 98.6 °F (37 °C) 80 16 124/68 98 %   12/15/22 0258 -- 84 -- 116/60 --   12/15/22 0243 -- 80 -- 111/65 --   12/15/22 0230 98.3 °F (36.8 °C) -- -- -- --   12/15/22 0228 -- 96 -- 103/64 --   12/15/22 0213 -- 80 -- 111/60 94 %   12/15/22 0158 -- 64 -- (!) 103/58 --   12/15/22 0143 -- 63 -- (!) 101/50 --   12/15/22 0139 -- 63 -- (!) 100/51 --   12/15/22 0137 -- 64 -- (!) 96/52 --       Cervical Exam: 9.5 cm dilated    90% effaced    0 station    Presenting Part: cephalic  Cervical Position: mid position  Consistency: Soft  Membranes:  Spontaneous Rupture of Membranes;  Amniotic Fluid: clear fluid  Fetal Heart Rate: Reactive  Baseline: 120 beats per minute  Variability: moderate  Accelerations: yes  Decelerations: intermittent late with emanuel to 110  Uterine contractions: regular, every 1-3 minutes      Assessment:   Intrauterine pregnancy at term  Category 1 fetal heart rate tracing         Plan:   Continue current orders/management   [facility] management   Anticipate   Avelino Roberts CNM

## 2022-12-15 NOTE — ANESTHESIA PREPROCEDURE EVALUATION
Relevant Problems   No relevant active problems     Past Medical History:   Diagnosis Date    Essential hypertension 6710-2876    Stress related.   Never medicated, Resolved         Anesthesia Evaluation    Physical Exam    Airway  Mallampati: II  TM Distance: 4 - 6 cm  Neck ROM: normal range of motion   Mouth opening: Normal     Cardiovascular    Rhythm: regular           Dental  No notable dental hx       Pulmonary  Breath sounds clear to auscultation               Abdominal         Other Findings            Anesthetic Plan    ASA: 2  Anesthesia type: epidural            Anesthetic plan and risks discussed with: Patient

## 2022-12-15 NOTE — PROGRESS NOTES
2127: Pt ambulating to room 206 with  w/ c/o contrax q6-12min beginning at 1600. She denies any LOF but has had some pink vaginal discharge. She was 3/70 in the office yesterday and had her membranes stripped. She endorses +FM. 2138: SVE by Benson Cottrell MD. 6/70/-2.    2230: Pt bolusing for epidural.    2320: SROM clear moderate fluid at this time. SVE by Samira Amado. 7/80/-1.    2330: Nitrous oxide being used by pt.     5492: Pt involuntarily pushing at this time. SVE by Samira Amdao. 9 cm. 0030: Pt stopped pushing at this time for epidural.     0036: Angel Epley MD at bedside for epidural placement. Timeout performed at this time. RN attempting to continuously monitor FHR during epidural placement. 0335: Patient actively pushing. RN remains in continuous attendance at the bedside. Assessment & evaluation of fetal heart rate ongoing via continuous EFM.    0346: RN remained at bedside throughout pushing. EFM continuously assessed. Vaginal delivery of viable female infant. Del QBL: 500 ml.     0600: 2 hr QBL: 70 ml. Total QBL: 570 ml.     0620: TRANSFER - OUT REPORT:    Verbal report given to Brittany Crystal RN (name) on Mario Flores  being transferred to MIU (unit) for routine progression of care       Report consisted of patients Situation, Background, Assessment and   Recommendations(SBAR). Information from the following report(s) SBAR, Kardex, Procedure Summary, Intake/Output, MAR, and Recent Results was reviewed with the receiving nurse. Lines:   Peripheral IV 12/14/22 Left;Posterior Hand (Active)   Site Assessment Clean, dry, & intact 12/15/22 0406   Phlebitis Assessment 0 12/15/22 0406   Infiltration Assessment 0 12/15/22 0406   Dressing Status Clean, dry, & intact 12/15/22 0406   Dressing Type Tape;Transparent 12/15/22 0406   Hub Color/Line Status Pink; Infusing 12/15/22 0406   Action Taken Open ports on tubing capped 12/15/22 0406   Alcohol Cap Used Yes 12/15/22 0406        Opportunity for questions and clarification was provided.       Patient transported with:   Registered Nurse

## 2022-12-15 NOTE — L&D DELIVERY NOTE
Delivery Summary    Patient: Damion Walker MRN: [id number]  SSN: [id number]    YOB: 1990  Age: 33 y.o. Sex: female       Information for the patient's :  Amanda Rosado [[id number]]     Labor Events:    Labor: No    Steroids: None   Cervical Ripening Date/Time:       Cervical Ripening Type: None   Antibiotics During Labor: No   Rupture Identifier:      Rupture Date/Time:       Rupture Type: SROM   Amniotic Fluid Volume: Moderate    Amniotic Fluid Description: Clear    Amniotic Fluid Odor:      Induction: None       Induction Date/Time:        Indications for Induction:      Augmentation: Oxytocin   Augmentation Date/Time: 12/15/36590:24 AM   Indications for Augmentation: Ineffective Contraction Pattern   Labor complications: None       Additional complications:        Delivery Events:  Indications For Episiotomy:     Episiotomy: None   Perineal Laceration(s): 2nd   Repaired: Yes   Periurethral Laceration Location:      Repaired:     Labial Laceration Location:     Repaired:     Sulcal Laceration Location:     Repaired:     Vaginal Laceration Location:     Repaired:     Cervical Laceration Location:     Repaired:     Repair Suture: Vicryl 2-0   Number of Repair Packets: 2   Estimated Blood Loss (ml):  ml   Quantitative Blood Loss (ml)                Delivery Date: 12/15/2022    Delivery Time: 3:46 AM  Delivery Type: Vaginal, Spontaneous  Sex:  Female    Gestational Age: 37w0d   Delivery Clinician:  Rani Bean  Living Status: Living   Delivery Location: L&D 206          APGARS  One minute Five minutes Ten minutes   Skin color:            Heart rate:            Grimace:            Muscle tone:            Breathing: Totals:                Presentation: Vertex    Position:   Occiput Posterior  Resuscitation Method:  Suctioning-bulb; Tactile Stimulation     Meconium Stained: Terminal      Cord Information: 3 Vessels  Complications:  Other(Comment) (Comment)  Cord around: head  trunk  Delayed cord clamping? Yes  Cord clamped date/time:12/15/2022  3:48 AM  Disposition of Cord Blood: Discard    Blood Gases Sent?: No    Placenta:  Date/Time: 12/15/2022  3:54 AM  Removal: Expressed      Appearance: Normal      Measurements:  Birth Weight:        Birth Length:        Head Circumference:        Chest Circumference:       Abdominal Girth: Other Providers:   GABRIELA Solitario;STEPHY ORDAZ;Tony MOORE, Obstetrician;Primary Nurse;Primary [place] Nurse;Charge Nurse         Group B Strep:   Lab Results   Component Value Date/Time    GrKATHYtrep, External negative 11/15/2022 12:00 AM     Information for the patient's :  Malou Lopez [[id number]]   No results found for: ABORH, PCTABR, PCTDIG, BILI, ABORHEXT, ABORH   No results for input(s): PCO2CB, PO2CB, HCO3I, SO2I, IBD, PTEMPI, SPECTI, PHICB, ISITE, IDEV, IALLEN in the last 72 hours. Delivery Note:     Patient pushed effectively and fetal head was delivered over intact perineum. Tight nuchal cord x 1 was noted and reduced. The anterior shoulder followed by the posterior shoulder and body were subsequently delivered. The infant was placed on maternal abdomen. The cord was then clamped and cut after 1 minute of pulsation. Cord blood was taken. A pumping vaginal artery was noted on the left labia. This was clamped using a ring forceps. The placenta delivered spontaneously, intact, with 3VC. Pitocin was started. The uterus was noted to be boggy so methergine IM and cytotec MD were given. The vagina, cervix and perineum were examined and 2nd laceration was found. This was repaired in the usual fashion using 2-0 Vicryl.  mL. No complications. Apgars 8,9. Mom and baby doing well. Dr. Thakkar Coppola delivering.      Mainor Quintero MD  [facility]

## 2022-12-15 NOTE — H&P
History & Physical    Name: Marie Garrido MRN: [id number]  SSN: [id number]    YOB: 1990  Age: 33 y.o. Sex: female        Subjective:       Estimated Date of Delivery: 22  OB History          2    Para   1    Term   1            AB        Living   1         SAB        IAB        Ectopic        Molar        Multiple   0    Live Births   1                Ms. Thomas William is admitted with pregnancy at 40w6d for active labor. Reports contractions started after membrane strip in the office yesterday. Since about 4:30 this afternoon, contractions have increased in frequency and intensity. No LOF, VB. Prenatal course was normal. Please see prenatal records for details. Past Medical History:   Diagnosis Date    Essential hypertension 4773-3503    Stress related. Never medicated, Resolved     Past Surgical History:   Procedure Laterality Date    HX OTHER SURGICAL      wisdom teeth    HX OTHER SURGICAL      tonsillectomy     Social History     Occupational History    Not on file   Tobacco Use    Smoking status: Never    Smokeless tobacco: Never   Substance and Sexual Activity    Alcohol use: Not Currently    Drug use: Not on file    Sexual activity: Not on file     History reviewed. No pertinent family history. Allergies   Allergen Reactions    Azithromycin Swelling    Cefprozil Hives     Prior to Admission medications    Medication Sig Start Date End Date Taking? Authorizing Provider   PNV No.40-Iron Fum-FA Cmb No.1 27-1 mg tab Take  by mouth. Yes Provider, Historical   ibuprofen (MOTRIN) 800 mg tablet Take 1 Tab by mouth every eight (8) hours as needed for Pain. Patient not taking: Reported on 2022   Bruno Matute MD        Review of Systems: Pertinent items are noted in HPI.     Objective:     Vitals:  Vitals:    22   Weight: 77.6 kg (171 lb)   Height: 5' 5\" (1.651 m)        Physical Exam:  Patient without distress, breathing through contractions  Abdomen: soft, nontender. EFW 7lb  Fundus: soft and non tender  Cervical Exam: 6 cm dilated    70% effaced    -2 station      Membranes:  Intact  Fetal Heart Rate: Baseline: 155 per minute  Variability: moderate  Accelerations: yes  Decelerations: none  Uterine contractions: regular, every 7 minutes    Prenatal Labs:   Lab Results   Component Value Date/Time    Rubella, External Immune 2019 12:00 AM    GrBStrep, External Negative 01/10/2020 12:00 AM    HBsAg, External Negative 2019 12:00 AM    HIV, External Non reactive 2019 12:00 AM    Gonorrhea, External Negative 2019 12:00 AM    Chlamydia, External negative 2019 12:00 AM        Assessment/Plan:     Plan: 33yo  at 40w6d, admit for Labor  Continue expectant management.     - Rh + / GBS neg  - Diet CLD  - Fetal well being: Cat 1   - Anesthesia: Desires epidural when uncomfortable  - Labor plan: Expectant management for now    >Once epidural placed, plan for AROM    Signed By:  Wes France MD     2022

## 2022-12-15 NOTE — DISCHARGE SUMMARY
Obstetrical Discharge Summary     Name: Cuco Goss MRN: [id number]  SSN: [id number]    YOB: 1990  Age: 33 y.o. Sex: female      Admit Date: 2022    Discharge Date: 22     Attending Physician:  John Gallagher MD     Delivering Physician:  Saud Campbell MD    * Admission Diagnoses:   IUP @ 41w0d      * Discharge Diagnoses:   Delivery of a viable infant via  by Saud Campbell MD on 12/15/2022. Apgars were 8 and 9. Same as above       Additional Diagnoses:   Hospital Problems as of 12/15/2022 Never Reviewed            Codes Class Noted - Resolved POA    Pregnancy ICD-10-CM: Z34.90  ICD-9-CM: V22.2  2022 - Present Unknown          Lab Results   Component Value Date/Time    Rubella, External immune 2022 12:00 AM    GrBStrep, External negative 11/15/2022 12:00 AM      There is no immunization history for the selected administration types on file for this patient. * Procedures:        * Discharge Condition: good    * Hospital Course: Normal hospital course following the delivery. * Disposition: Home    Discharge Medications:   Current Discharge Medication List          * Follow-up Care/Patient Instructions:   Activity: Activity as tolerated  Diet: Regular Diet  Wound Care: As directed  Followup 6 weeks for PP check        Signed By:  Yoko Mcginnis MD     December 15, 2022

## 2022-12-16 LAB
ERYTHROCYTE [DISTWIDTH] IN BLOOD BY AUTOMATED COUNT: 13.2 % (ref 11.5–14.5)
HCT VFR BLD AUTO: 31.7 % (ref 35–47)
HGB BLD-MCNC: 10.6 G/DL (ref 11.5–16)
MCH RBC QN AUTO: 30.2 PG (ref 26–34)
MCHC RBC AUTO-ENTMCNC: 33.4 G/DL (ref 30–36.5)
MCV RBC AUTO: 90.3 FL (ref 80–99)
NRBC # BLD: 0 K/UL (ref 0–0.01)
NRBC BLD-RTO: 0 PER 100 WBC
PLATELET # BLD AUTO: 208 K/UL (ref 150–400)
PMV BLD AUTO: 10.6 FL (ref 8.9–12.9)
RBC # BLD AUTO: 3.51 M/UL (ref 3.8–5.2)
WBC # BLD AUTO: 14.1 K/UL (ref 3.6–11)

## 2022-12-16 PROCEDURE — 65270000029 HC RM PRIVATE

## 2022-12-16 PROCEDURE — 36415 COLL VENOUS BLD VENIPUNCTURE: CPT

## 2022-12-16 PROCEDURE — 74011250637 HC RX REV CODE- 250/637: Performed by: STUDENT IN AN ORGANIZED HEALTH CARE EDUCATION/TRAINING PROGRAM

## 2022-12-16 PROCEDURE — 85027 COMPLETE CBC AUTOMATED: CPT

## 2022-12-16 RX ORDER — ACETAMINOPHEN 325 MG/1
650 TABLET ORAL EVERY 6 HOURS
Status: DISCONTINUED | OUTPATIENT
Start: 2022-12-16 | End: 2022-12-17 | Stop reason: HOSPADM

## 2022-12-16 RX ORDER — LANOLIN ALCOHOL/MO/W.PET/CERES
1 CREAM (GRAM) TOPICAL
Status: DISCONTINUED | OUTPATIENT
Start: 2022-12-16 | End: 2022-12-17 | Stop reason: HOSPADM

## 2022-12-16 RX ORDER — IBUPROFEN 800 MG/1
800 TABLET ORAL EVERY 8 HOURS
Status: DISCONTINUED | OUTPATIENT
Start: 2022-12-16 | End: 2022-12-17 | Stop reason: HOSPADM

## 2022-12-16 RX ORDER — OXYCODONE HYDROCHLORIDE 5 MG/1
5 TABLET ORAL
Status: DISCONTINUED | OUTPATIENT
Start: 2022-12-16 | End: 2022-12-17 | Stop reason: HOSPADM

## 2022-12-16 RX ADMIN — DOCUSATE SODIUM 100 MG: 100 CAPSULE, LIQUID FILLED ORAL at 18:56

## 2022-12-16 RX ADMIN — DOCUSATE SODIUM 100 MG: 100 CAPSULE, LIQUID FILLED ORAL at 10:20

## 2022-12-16 RX ADMIN — OXYCODONE 5 MG: 5 TABLET ORAL at 23:17

## 2022-12-16 RX ADMIN — OXYCODONE 5 MG: 5 TABLET ORAL at 10:20

## 2022-12-16 RX ADMIN — ACETAMINOPHEN 650 MG: 325 TABLET ORAL at 14:27

## 2022-12-16 RX ADMIN — IBUPROFEN 800 MG: 800 TABLET, FILM COATED ORAL at 23:17

## 2022-12-16 RX ADMIN — IBUPROFEN 800 MG: 800 TABLET, FILM COATED ORAL at 14:27

## 2022-12-16 RX ADMIN — ACETAMINOPHEN 650 MG: 325 TABLET ORAL at 02:36

## 2022-12-16 RX ADMIN — Medication 1 TABLET: at 10:20

## 2022-12-16 RX ADMIN — FERROUS SULFATE TAB 325 MG (65 MG ELEMENTAL FE) 325 MG: 325 (65 FE) TAB at 10:20

## 2022-12-16 RX ADMIN — IBUPROFEN 800 MG: 800 TABLET, FILM COATED ORAL at 06:14

## 2022-12-16 RX ADMIN — ACETAMINOPHEN 650 MG: 325 TABLET ORAL at 06:14

## 2022-12-16 NOTE — PROGRESS NOTES
PostPartum Note    Casandra Bajwa  [id number]  1990  33 y.o.    S:  Ms. Casandra Bajwa is a 33 y.o.  PPD #1 s/p  @ 41w0d. Doing well. She had a baby girl. Her lochia is like a period. Pain mostly from vaginal repair, some cramping. She is ambulating and voiding. Tolerating PO intake. O:   Visit Vitals  /80 (BP 1 Location: Right upper arm, BP Patient Position: At rest)   Pulse 82   Temp 98.4 °F (36.9 °C)   Resp 16   Ht 5' 5\" (1.651 m)   Wt 77.6 kg (171 lb)   SpO2 98%   Breastfeeding Unknown   BMI 28.46 kg/m²       Gen - No acute distress  Respirations - nonlabored   Abdomen - Fundus firm below the umbilicus   Ext - Warm, well perfused, nontender     Lab Results   Component Value Date/Time    WBC 14.1 (H) 2022 02:37 AM    HGB 10.6 (L) 2022 02:37 AM    HCT 31.7 (L) 2022 02:37 AM    PLATELET 129  02:37 AM    MCV 90.3 2022 02:37 AM         A/P:  33 y.o.  PPD #1 s/p  @ 41w0d doing well. 1.  Routine PP instructions/ care discussed  2. Blood type - Rh pos  3. Rubella immune  4. Anemia - asymptomatic, start PO iron  5. Meeting all milestones, desires discharge home tomorrow   6. F/U 4-6 weeks for PP check.       Roxy Mejia MD  [facility]

## 2022-12-16 NOTE — LACTATION NOTE
This note was copied from a baby's chart. Mom has visitors and states nursing going well. Not seen today.

## 2022-12-17 VITALS
DIASTOLIC BLOOD PRESSURE: 78 MMHG | WEIGHT: 171 LBS | HEIGHT: 65 IN | RESPIRATION RATE: 16 BRPM | HEART RATE: 62 BPM | SYSTOLIC BLOOD PRESSURE: 134 MMHG | BODY MASS INDEX: 28.49 KG/M2 | OXYGEN SATURATION: 94 % | TEMPERATURE: 97.9 F

## 2022-12-17 PROCEDURE — 74011250637 HC RX REV CODE- 250/637: Performed by: STUDENT IN AN ORGANIZED HEALTH CARE EDUCATION/TRAINING PROGRAM

## 2022-12-17 RX ADMIN — FERROUS SULFATE TAB 325 MG (65 MG ELEMENTAL FE) 325 MG: 325 (65 FE) TAB at 08:58

## 2022-12-17 RX ADMIN — IBUPROFEN 800 MG: 800 TABLET, FILM COATED ORAL at 08:58

## 2022-12-17 RX ADMIN — Medication 1 TABLET: at 08:58

## 2022-12-17 RX ADMIN — DOCUSATE SODIUM 100 MG: 100 CAPSULE, LIQUID FILLED ORAL at 08:58

## 2022-12-17 NOTE — PROGRESS NOTES
Post-Partum Day Number 2 Progress Note    Marie Garrido       Information for the patient's :  Alireza Gomez [[id number]]   Vaginal, Spontaneous  Patient doing well without significant complaint. Voiding without difficulty, normal lochia. Tolerating diet without nausea or vomiting. Pain controlled with oral medications. Vitals:  Visit Vitals  /78   Pulse 62   Temp 97.9 °F (36.6 °C)   Resp 16   Ht 5' 5\" (1.651 m)   Wt 77.6 kg (171 lb)   SpO2 94%   Breastfeeding Unknown   BMI 28.46 kg/m²     Temp (24hrs), Av.1 °F (36.7 °C), Min:97.9 °F (36.6 °C), Max:98.2 °F (36.8 °C)        Exam:   Patient without distress. FF @ U-2 NT                LE NT w/o edema    Labs:     Lab Results   Component Value Date/Time    WBC 14.1 (H) 2022 02:37 AM    WBC 12.5 (H) 2022 10:18 PM    WBC 27.3 (H) 2020 09:26 AM    WBC 14.3 (H) 2020 02:02 AM    HGB 10.6 (L) 2022 02:37 AM    HGB 12.1 2022 10:18 PM    HGB 11.3 (L) 2020 09:26 AM    HGB 12.6 2020 02:02 AM    HCT 31.7 (L) 2022 02:37 AM    HCT 35.3 2022 10:18 PM    HCT 32.9 (L) 2020 09:26 AM    HCT 37.0 2020 02:02 AM    PLATELET 082  02:37 AM    PLATELET 854  10:18 PM    PLATELET 977  09:26 AM    PLATELET 270  02:02 AM     Lab Results   Component Value Date/Time    Rubella, External immune 2022 12:00 AM    GrBStrep, External negative 11/15/2022 12:00 AM    HBsAg, External negative 2022 12:00 AM    HIV, External negative 2022 12:00 AM    RPR, External non-reactive 2022 12:00 AM    Gonorrhea, External negative 2022 12:00 AM    Chlamydia, External negative 2022 12:00 AM           Assessment:   PPD 2  s/p , Doing well and stable  Plan:  1. Continue routine postpartum care  2. Discharge home today.    3. Medical complications: none      Chen Chau DO  2022  8:26 AM

## 2024-01-31 ENCOUNTER — HOSPITAL ENCOUNTER (OUTPATIENT)
Facility: HOSPITAL | Age: 34
Setting detail: RECURRING SERIES
Discharge: HOME | End: 2024-02-03
Payer: COMMERCIAL

## 2024-01-31 PROCEDURE — 97535 SELF CARE MNGMENT TRAINING: CPT

## 2024-01-31 PROCEDURE — 97112 NEUROMUSCULAR REEDUCATION: CPT

## 2024-01-31 PROCEDURE — 97162 PT EVAL MOD COMPLEX 30 MIN: CPT

## 2024-01-31 NOTE — THERAPY EVALUATION
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   8-22 min = 1 unit; 23-37 min = 2 units; 38-52 min = 3 units;  53-67 min = 4 units; 68-82 min = 5 units     SUBJECTIVE  Pain Level (0-10 scale): 2-3/10 current, Worst 5/10  []constant []intermittent []improving []worsening []no change since onset    Any medication changes, allergies to medications, adverse drug reactions, diagnosis change, or new procedure performed?: [x] No    [] Yes (see summary sheet for update)  Medications: Verified on Patient Summary List    Subjective functional status/changes:     2nd  in Dec 2022. Leonidas side up and 2nd degree tear. Tried to start training for a half marathon but started to feel hip discomfort pierre R side around mile 4. Having tailbone and SI discomfort/weakness.    Has always had cramps on R side during IC. And now with tampons everything feels tight.     Slightly more difficult to hold urges. Feels like she full empties. Pees 3 times before she falls asleep.     Reports BMs about daily. Denies

## 2024-02-12 ENCOUNTER — HOSPITAL ENCOUNTER (OUTPATIENT)
Facility: HOSPITAL | Age: 34
Setting detail: RECURRING SERIES
Discharge: HOME | End: 2024-02-15
Payer: COMMERCIAL

## 2024-02-12 PROCEDURE — 97110 THERAPEUTIC EXERCISES: CPT

## 2024-02-12 PROCEDURE — 97112 NEUROMUSCULAR REEDUCATION: CPT

## 2024-02-12 NOTE — PROGRESS NOTES
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   8-22 min = 1 unit; 23-37 min = 2 units; 38-52 min = 3 units; 53-67 min = 4 units; 68-82 min = 5 units        SUBJECTIVE    Pain Level (0-10 scale): 0    Any medication changes, allergies to medications, adverse drug reactions, diagnosis change, or new procedure performed?: [x] No    [] Yes (see summary sheet for update)  Medications: Verified on Patient Summary List    Subjective functional status/changes:       Did breathing and yoga. Finds she goes to the bathroom ever hour and a half but was able to push to 2 hours.     OBJECTIVE    Therapeutic Procedures:  Tx Min Billable or 1:1 Min (if diff from Tx Min) Procedure, Rationale, Specifics   20 20 87050 Neuromuscular Re-Education (timed):  improve balance, coordination, kinesthetic sense, posture, core stability and proprioception to improve patient's ability to develop conscious control of individual muscles and awareness of position of extremities in order to progress to PLOF and address remaining functional goals. (see flow sheet as applicable)     Details if applicable:     27 19 81605 Therapeutic Exercise (timed):  increase ROM, strength, coordination, balance, and proprioception to improve patient's ability

## 2024-02-22 ENCOUNTER — HOSPITAL ENCOUNTER (OUTPATIENT)
Facility: HOSPITAL | Age: 34
Setting detail: RECURRING SERIES
Discharge: HOME | End: 2024-02-25
Payer: COMMERCIAL

## 2024-02-22 PROCEDURE — 97112 NEUROMUSCULAR REEDUCATION: CPT

## 2024-02-22 NOTE — PROGRESS NOTES
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   8-22 min = 1 unit; 23-37 min = 2 units; 38-52 min = 3 units; 53-67 min = 4 units; 68-82 min = 5 units        SUBJECTIVE    Pain Level (0-10 scale): 0    Any medication changes, allergies to medications, adverse drug reactions, diagnosis change, or new procedure performed?: [x] No    [] Yes (see summary sheet for update)  Medications: Verified on Patient Summary List    Subjective functional status/changes:       Did barre this week and felt good but challenging. Still has to pee all the time 1.5-2 hours but a lot of pee comes out. No change in her pain. Frequency of seepage has improved.     OBJECTIVE    Therapeutic Procedures:  Tx Min Billable or 1:1 Min (if diff from Tx Min) Procedure, Rationale, Specifics   51 29 33846 Neuromuscular Re-Education (timed):  improve balance, coordination, kinesthetic sense, posture, core stability and proprioception to improve patient's ability to develop conscious control of individual muscles and awareness of position of extremities in order to progress to PLOF and address remaining functional goals. (see flow sheet as applicable)     Details if applicable:       48317 Therapeutic Exercise (timed):  increase ROM, strength,

## 2024-02-26 ENCOUNTER — APPOINTMENT (OUTPATIENT)
Facility: HOSPITAL | Age: 34
End: 2024-02-26
Payer: COMMERCIAL

## 2024-03-04 ENCOUNTER — HOSPITAL ENCOUNTER (OUTPATIENT)
Facility: HOSPITAL | Age: 34
Setting detail: RECURRING SERIES
Discharge: HOME | End: 2024-03-07
Payer: COMMERCIAL

## 2024-03-04 PROCEDURE — 97535 SELF CARE MNGMENT TRAINING: CPT

## 2024-03-04 PROCEDURE — 97112 NEUROMUSCULAR REEDUCATION: CPT

## 2024-03-04 NOTE — PROGRESS NOTES
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 / Product Type: *No Product type* /                   Patient  verified yes     Visit #   Current  / Total 4 12   Time   In / Out 701a 754a   Total Treatment Time 53   Total Timed Codes 53   1:1 Treatment Time 53      [facility] Totals Reminder:  bill using total billable   min of TIMED therapeutic procedures and modalities.   8-22 min = 1 unit; 23-37 min = 2 units; 38-52 min = 3 units; 53-67 min = 4 units; 68-82 min = 5 units        SUBJECTIVE    Pain Level (0-10 scale): 0    Any medication changes, allergies to medications, adverse drug reactions, diagnosis change, or new procedure performed?: [x] No    [] Yes (see summary sheet for update)  Medications: Verified on Patient Summary List    Subjective functional status/changes:       Has bee going to the gym a lot, run/walking 3 days- started to feel R hip discomfort around a mile/mile and a half. Hips still feel weak. Frequency at night is better- not quite making it to 2 hours during the day, sometimes leaks on the way.     OBJECTIVE    Therapeutic Procedures:  Tx Min Billable or 1:1 Min (if diff from Tx Min) Procedure, Rationale, Specifics   43 43 05128 Neuromuscular Re-Education (timed):  improve balance, coordination, kinesthetic sense, posture, core stability and proprioception to improve patient's ability to develop conscious control of individual muscles and awareness of position of extremities in order to progress to PLOF and address remaining functional goals. (see flow sheet as applicable)     Details if applicable:     10 10

## 2024-03-21 ENCOUNTER — HOSPITAL ENCOUNTER (OUTPATIENT)
Facility: HOSPITAL | Age: 34
Setting detail: RECURRING SERIES
Discharge: HOME | End: 2024-03-24
Payer: COMMERCIAL

## 2024-03-21 PROCEDURE — 97112 NEUROMUSCULAR REEDUCATION: CPT

## 2024-03-21 NOTE — PROGRESS NOTES
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 / Plan: [facility] / Product Type: *No Product type* /                   Patient  verified yes     Visit #   Current  / Total 5 12   Time   In / Out 1017a 1105a   Total Treatment Time 48   Total Timed Codes 48   1:1 Treatment Time 48      [facility] Totals Reminder:  bill using total billable   min of TIMED therapeutic procedures and modalities.   8-22 min = 1 unit; 23-37 min = 2 units; 38-52 min = 3 units; 53-67 min = 4 units; 68-82 min = 5 units        SUBJECTIVE    Pain Level (0-10 scale): 0    Any medication changes, allergies to medications, adverse drug reactions, diagnosis change, or new procedure performed?: [x] No    [] Yes (see summary sheet for update)  Medications: Verified on Patient Summary List    Subjective functional status/changes:       Has been going to the gym a lot. Running 2 miles before hip starts to bother her. Sleeping through the night more. Leaked maybe 1 time.     OBJECTIVE    Therapeutic Procedures:  Tx Min Billable or 1:1 Min (if diff from Tx Min) Procedure, Rationale, Specifics   48 70 24921 Neuromuscular Re-Education (timed):  improve balance, coordination, kinesthetic sense, posture, core stability and proprioception to improve patient's ability to develop conscious control of individual muscles and awareness of position of extremities in order to progress to PLOF and address remaining functional goals. (see flow sheet as applicable)     Details if applicable:       92900 Self Care/Home Management (timed):  improve patient knowledge and understanding of pain reducing

## 2024-04-04 ENCOUNTER — HOSPITAL ENCOUNTER (OUTPATIENT)
Facility: HOSPITAL | Age: 34
Setting detail: RECURRING SERIES
Discharge: HOME | End: 2024-04-07
Payer: COMMERCIAL

## 2024-04-04 PROCEDURE — 97535 SELF CARE MNGMENT TRAINING: CPT

## 2024-04-04 PROCEDURE — 97140 MANUAL THERAPY 1/> REGIONS: CPT

## 2024-04-04 PROCEDURE — 97112 NEUROMUSCULAR REEDUCATION: CPT

## 2024-04-04 NOTE — PROGRESS NOTES
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   8-22 min = 1 unit; 23-37 min = 2 units; 38-52 min = 3 units; 53-67 min = 4 units; 68-82 min = 5 units        SUBJECTIVE    Pain Level (0-10 scale): 0    Any medication changes, allergies to medications, adverse drug reactions, diagnosis change, or new procedure performed?: [x] No    [] Yes (see summary sheet for update)  Medications: Verified on Patient Summary List    Subjective functional status/changes:       Having more hip discomfort for the last couple days. Running more. Having trouble holding urges. Some fecal smearing still.     OBJECTIVE    Therapeutic Procedures:  Tx Min Billable or 1:1 Min (if diff from Tx Min) Procedure, Rationale, Specifics   10 10 34670 Neuromuscular Re-Education (timed):  improve balance, coordination, kinesthetic sense, posture, core stability and proprioception to improve patient's ability to develop conscious control of individual muscles and awareness of position of extremities in order to progress to PLOF and address remaining functional goals. (see flow sheet as applicable)     Details if applicable:      52367 Self Care/Home Management (timed):  improve patient knowledge and understanding of pain reducing techniques,

## 2024-04-22 ENCOUNTER — HOSPITAL ENCOUNTER (OUTPATIENT)
Facility: HOSPITAL | Age: 34
Setting detail: RECURRING SERIES
Discharge: HOME | End: 2024-04-25
Payer: COMMERCIAL

## 2024-04-22 PROCEDURE — 97535 SELF CARE MNGMENT TRAINING: CPT

## 2024-04-22 PROCEDURE — 97112 NEUROMUSCULAR REEDUCATION: CPT

## 2024-04-22 NOTE — PROGRESS NOTES
by at least 1 finger width throughout to improve pelvic stability with childcare activities. - MET     Long Term Goals: To be accomplished in 12 treatments.  Patient will report worst pain no greater than 2/10 to increase QOL and allow for independence with all hygienic self-care and ADL skills - MET  Patient will demonstrate 5/5 BLE strength to allow for home cleaning skills independently and without rest breaks needed- IN PROGRESS  Patient will report 0/10 pain with intercourse or tampon use - MET    PLAN  No  Continue plan of care  Re-Cert Due: 12 visits  []  Upgrade activities as tolerated  [x]  Discharge due to : goals met  []  Other:    Reba Wolf, PT, DPT       4/22/2024       7:05 AM

## 2024-04-22 NOTE — THERAPY DISCHARGE
Physical Therapy at [place],   a part of [facility]  [address], [address]  [place] [id number]  Phone: [phone]  Fax: [phone]  DISCHARGE SUMMARY  Patient Name: Sweta Alfaro : 1990   Treatment/Medical Diagnosis: Bilateral hip pain [M25.551, M25.552]   Referral Source: Hali Wills MD     Date of Initial Visit: 24 Attended Visits: 7 Missed Visits: 0     SUMMARY OF TREATMENT    Sweta has completed 7 pelvic health PT sessions including patient education, therapeutic exercise, manual therapy, neuromuscular re-education to address R hip pain, urinary urgency, and fecal smearing. She has made great progress towards goals with significantly improved activity tolerance, urinary urgency, and hip pain. She reports independence with symptoms management at this time and is agreeable to d/c to HEP.     CURRENT STATUS    YENNIFER:              Above umbilicus: 0 (improved from 1)              At umbilicus: 0 (improved from 1.5)              Below umbilicus: 0 (improved from 1)    Short Term Goals: To be accomplished in 4 treatments.  Patient will be independent with a progressive home exercise program without needed v.c. - MET  Patient will be independent with urge suppression techniques, bladder irritant elimination in order to decrease bothersome urinary urge / urge incontinence episodes by a minimum of 50% per subjective report. - MET  Patient will improve breath coordination demonstrated by decreased YENNIFER by at least 1 finger width throughout to improve pelvic stability with childcare activities. - MET     Long Term Goals: To be accomplished in 12 treatments.  Patient will report worst pain no greater than 2/10 to increase QOL and allow for independence with all hygienic self-care and ADL skills - MET  Patient will demonstrate 5/5 BLE strength to allow for home cleaning skills independently and without rest breaks needed- IN PROGRESS  Patient will

## 2024-06-04 ENCOUNTER — OFFICE VISIT (OUTPATIENT)
Age: 34
End: 2024-06-04
Payer: COMMERCIAL

## 2024-06-04 VITALS — WEIGHT: 160 LBS | HEIGHT: 65 IN | BODY MASS INDEX: 26.66 KG/M2

## 2024-06-04 DIAGNOSIS — N63.23 MASS OF LOWER OUTER QUADRANT OF LEFT BREAST: Primary | ICD-10-CM

## 2024-06-04 PROCEDURE — 76642 ULTRASOUND BREAST LIMITED: CPT | Performed by: SURGERY

## 2024-06-04 PROCEDURE — 99202 OFFICE O/P NEW SF 15 MIN: CPT | Performed by: SURGERY

## 2024-06-04 RX ORDER — NORGESTIMATE AND ETHINYL ESTRADIOL 7DAYSX3 LO
KIT ORAL
COMMUNITY

## 2024-06-04 NOTE — PROGRESS NOTES
HISTORY OF PRESENT ILLNESS  Sweta Alfaro is a 34 y.o. female     HPI NEW patient referral for consultation by Dr. Hagan for a LEFT breast palpable lump. The patient found the lump in April when she started running again. The lump is tender to palpation.     Family history   Paternal grandmother had breast cancer,  from colon cancer in her 60's    No imaging    Past Medical History:   Diagnosis Date    Essential hypertension 2522-9535    Stress related.  Never medicated, Resolved     Past Surgical History:   Procedure Laterality Date    OTHER SURGICAL HISTORY      wisdom teeth    OTHER SURGICAL HISTORY      tonsillectomy      OB History          2    Para        Term   2            AB        Living   2         SAB        IAB        Ectopic        Molar        Multiple        Live Births              Obstetric Comments   Menarche 14, LMP 25, # of children 2, age of 1st delivery 29, Hysterectomy/oophorectomy no/no, Breast bx none, history of breast feeding yes, BCP yes, Hormone therapy none               No family history on file.  Social History     Tobacco Use    Smoking status: Never    Smokeless tobacco: Never   Substance Use Topics    Alcohol use: Not Currently      Prior to Admission medications    Medication Sig Start Date End Date Taking? Authorizing Provider   Norgestim-Eth Estrad Triphasic (TRI-LO-ALEXANDRA) 0.18/0.215/0.25 MG-25 MCG TABS Take by mouth   Yes Provider, Historical, MD      Allergies   Allergen Reactions    Azithromycin Swelling    Cefprozil Hives                Review of Systems      Physical Exam  Chest:   Breasts:     Right: No swelling, mass, skin change or tenderness.      Left: Mass (soft oval mobile mass LOQ) present. No swelling, skin change or tenderness.       Lymphadenopathy:      Upper Body:      Right upper body: No axillary adenopathy.      Left upper body: No axillary adenopathy.        BREAST ULTRASOUND  Indication: LEFT breast mass LOQ  Technique:  The LEFT

## 2024-06-04 NOTE — PROGRESS NOTES
HISTORY OF PRESENT ILLNESS  Sweta Alfaro is a 34 y.o. female     HPI NEW patient consult referred by  Dr. Hagan for LEFT breast cyst.      Family History:      Mammogram, ***, BIRADS ***      Review of Systems      Physical Exam       ASSESSMENT and PLAN  {Assessment and Plan Chronic Disease:[id number]}

## 2025-03-17 ENCOUNTER — OFFICE VISIT (OUTPATIENT)
Age: 35
End: 2025-03-17
Payer: COMMERCIAL

## 2025-03-17 DIAGNOSIS — R41.89 COGNITIVE DECLINE: ICD-10-CM

## 2025-03-17 DIAGNOSIS — F41.1 GENERALIZED ANXIETY DISORDER: ICD-10-CM

## 2025-03-17 DIAGNOSIS — G31.84 MILD COGNITIVE IMPAIRMENT: Primary | ICD-10-CM

## 2025-03-17 DIAGNOSIS — R41.840 INATTENTION: ICD-10-CM

## 2025-03-17 DIAGNOSIS — F84.0 AUTISTIC BEHAVIOR: ICD-10-CM

## 2025-03-17 PROCEDURE — 90791 PSYCH DIAGNOSTIC EVALUATION: CPT | Performed by: CLINICAL NEUROPSYCHOLOGIST

## 2025-03-17 NOTE — PROGRESS NOTES
[facility]  NEUROLOGY CLINIC   [address]   [place] [address]   [phone] Office   [phone] Fax      Neuropsychology    Initial Diagnostic Interview Note      Referral:  Lia Lee MD    Sweta Alfaro is a 34 y.o. right handed   female who was unaccompanied to the initial clinical interview on 3/17/2025 .  Please refer to her medical records for details pertaining to her history.   At the start of the appointment, I reviewed the patient's [facility] Epic Chart (including Media scanned in from previous providers) for the active Problem List, all pertinent Past Medical Hx, medications, recent radiologic and laboratory findings.  In addition, I reviewed pt's documented Immunization Record and Encounter History.     Chief Complaint: New patient, establish care, for neurocognitive and psychologic concerns, as outlined above.       The patient  has a past medical history of Essential hypertension.    She  has a past surgical history that includes other surgical history and other surgical history.    Master's in Education completed without history of previously diagnosed LD and/or receipt of special education services.  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  She is no known background stroke, meningitis/encephalitis, BLAKE Fever, Lupus, Lyme, .TBI, sz.  She has always struggled to sleep.  Would take two hours to fall asleep and one day in college started listening to audiobooks and it turned down her internal anxiety monologue and helped.  She has a history of migraines which have improved of late.  Last was one year ago.  She has significant short term memory issues and word finding problems.  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  She has to write

## 2025-04-10 ENCOUNTER — PROCEDURE VISIT (OUTPATIENT)
Age: 35
End: 2025-04-10
Payer: COMMERCIAL

## 2025-04-10 DIAGNOSIS — F90.0 ADHD (ATTENTION DEFICIT HYPERACTIVITY DISORDER), INATTENTIVE TYPE: Chronic | ICD-10-CM

## 2025-04-10 DIAGNOSIS — F41.1 GENERALIZED ANXIETY DISORDER: ICD-10-CM

## 2025-04-10 DIAGNOSIS — G31.84 MILD COGNITIVE IMPAIRMENT: Primary | ICD-10-CM

## 2025-04-10 PROCEDURE — 96139 PSYCL/NRPSYC TST TECH EA: CPT | Performed by: CLINICAL NEUROPSYCHOLOGIST

## 2025-04-10 PROCEDURE — 96138 PSYCL/NRPSYC TECH 1ST: CPT | Performed by: CLINICAL NEUROPSYCHOLOGIST

## 2025-04-10 PROCEDURE — 96132 NRPSYC TST EVAL PHYS/QHP 1ST: CPT | Performed by: CLINICAL NEUROPSYCHOLOGIST

## 2025-04-10 PROCEDURE — 96133 NRPSYC TST EVAL PHYS/QHP EA: CPT | Performed by: CLINICAL NEUROPSYCHOLOGIST

## 2025-04-16 NOTE — PROGRESS NOTES
correlation is, of course, indicated.     I will discuss these findings with the patient when she follows up with me in the near future.  A follow up Neuropsychological Evaluation is indicated on a prn basis, especially if there are any cognitive and/or emotional changes.      DIAGNOSES:   The Referral Diagnosis Of  Cognitive Difficulties - IS NOT SUPPORTED        ADHD, Inattentive, Chronic      Anxiety - Mild       The above information is based upon information currently available to me.  If there is any additional information of which I am currently unaware, I would be more than happy to review it upon having it made available to me.  Thank you for the opportunity to see this interesting individual.     Sincerely,       Alf Griffin PsyD, EdS    CC: Lia Lee MD     Time Documentation:      96138 x 1  96139 x 5 Test Administration/Data Gathering By Technician: (3 hours). 96138 x 1 (first 30 minutes), 96138 x 5 (each additional 30 minutes)    96132 x 1  96133 x 2 Testing Evaluation Services by Neuropsychologist (2 hour, 45 minutes) 96132 x 1 (first hour), 96133 x 2 (1 hour, 45 minutes)    Definitions:      93423/95725: Neuropsychological Test Administration by Technician/Psychometrist, first 30 minutes and each additional 30 minutes.     84110/01434:  Neurobehavioral Status Exam, Clinical interview.  Clinical assessment of thinking, reasoning and judgment, by neuropsychologist, both face to face time with patient and time interpreting those test results and reporting, first and subsequent hours. Record review.  Review of history provided by patient.  Review of collaborative information.  Testing by Clinician.  Review of raw data. Scoring. Report writing of individual tests administered by Clinician.  Integration of individual tests administered by psychometrist with NSE/testing by clinician, review of records/history/collaborative information, case Conceptualization, treatment planning, clinical

## 2025-06-23 ENCOUNTER — TELEPHONE (OUTPATIENT)
Age: 35
End: 2025-06-23

## 2025-06-23 NOTE — TELEPHONE ENCOUNTER
The Pt called requesting her Tues. 6/24/25 appt. @ 2:20 pm. With Dr Griffin be changed to a Tele-Health appt. Instead. She can be reached at [phone]. She is requesting a call back this morning Please

## 2025-06-24 ENCOUNTER — TELEMEDICINE (OUTPATIENT)
Age: 35
End: 2025-06-24
Payer: COMMERCIAL

## 2025-06-24 DIAGNOSIS — F90.0 ADHD (ATTENTION DEFICIT HYPERACTIVITY DISORDER), INATTENTIVE TYPE: Chronic | ICD-10-CM

## 2025-06-24 DIAGNOSIS — F41.1 GENERALIZED ANXIETY DISORDER: ICD-10-CM

## 2025-06-24 DIAGNOSIS — G31.84 MILD COGNITIVE IMPAIRMENT: Primary | ICD-10-CM

## 2025-06-24 PROCEDURE — 96132 NRPSYC TST EVAL PHYS/QHP 1ST: CPT | Performed by: CLINICAL NEUROPSYCHOLOGIST

## 2025-06-24 NOTE — PROGRESS NOTES
Sweta Alfaro, was evaluated through a synchronous (real-time) audio-video encounter. The patient (or guardian if applicable) is aware that this is a billable service, which includes applicable co-pays. This Virtual Visit was conducted with patient's (and/or legal guardian's) consent. Patient identification was verified, and a caregiver was present when appropriate.   The patient was located at Home: [address] [place] [id number]  Provider was located at Facility (Appt Dept): [address] [place] [address]  [address]  [place] [address]  Confirm you are appropriately licensed, registered, or certified to deliver care in the state where the patient is located as indicated above. If you are not or unsure, please re-schedule the visit: Yes, I confirm.     Sweta Alfaro (:  1990) is a Established patient, presenting virtually for evaluation of the following:        Chief Complaint:  Follow up to discuss test results with this established patient related to neurocognitive and psychologic functioning, cognitive concerns and emotional/mood concerns as outlined below.     A neuropsychological evaluation was completed with the patient on 2023     Prior to seeing the patient for today's visit, I reviewed pertinent records, including the previously completed report, the records in Epic, and any updated visits from other providers since the patient's last visit.     During today's appointment I administered the following measures: NMSE, Fluency.  Exam normal          I provided feedback services related to the previously completed report. Attendees included: Patient. Education was provided regarding my diagnostic impressions, and we discussed treatment plan/options. Attendees were provided with the opportunity to ask questions, which were answered to the best of my ability.     We discussed, in detail, the following:     This patient generated a predominantly normal range Neuropsychological

## 2025-08-07 ENCOUNTER — HOSPITAL ENCOUNTER (OUTPATIENT)
Facility: HOSPITAL | Age: 35
Setting detail: RECURRING SERIES
Discharge: HOME | End: 2025-08-10
Payer: COMMERCIAL

## 2025-08-07 PROCEDURE — 97162 PT EVAL MOD COMPLEX 30 MIN: CPT | Performed by: PHYSICAL THERAPIST

## 2025-08-07 PROCEDURE — 97112 NEUROMUSCULAR REEDUCATION: CPT | Performed by: PHYSICAL THERAPIST

## 2025-08-14 ENCOUNTER — HOSPITAL ENCOUNTER (OUTPATIENT)
Facility: HOSPITAL | Age: 35
Setting detail: RECURRING SERIES
Discharge: HOME | End: 2025-08-17
Payer: COMMERCIAL

## 2025-08-14 PROCEDURE — 97112 NEUROMUSCULAR REEDUCATION: CPT | Performed by: PHYSICAL THERAPIST

## 2025-08-22 ENCOUNTER — HOSPITAL ENCOUNTER (OUTPATIENT)
Facility: HOSPITAL | Age: 35
Setting detail: RECURRING SERIES
Discharge: HOME | End: 2025-08-25
Payer: COMMERCIAL

## 2025-08-22 PROCEDURE — 97112 NEUROMUSCULAR REEDUCATION: CPT | Performed by: PHYSICAL THERAPIST

## 2025-09-03 ENCOUNTER — HOSPITAL ENCOUNTER (OUTPATIENT)
Facility: HOSPITAL | Age: 35
Setting detail: RECURRING SERIES
Discharge: HOME | End: 2025-09-06
Payer: COMMERCIAL

## 2025-09-03 PROCEDURE — 97112 NEUROMUSCULAR REEDUCATION: CPT | Performed by: PHYSICAL THERAPIST
